# Patient Record
Sex: FEMALE | Race: WHITE | NOT HISPANIC OR LATINO | Employment: FULL TIME | URBAN - METROPOLITAN AREA
[De-identification: names, ages, dates, MRNs, and addresses within clinical notes are randomized per-mention and may not be internally consistent; named-entity substitution may affect disease eponyms.]

---

## 2023-08-10 ENCOUNTER — OFFICE VISIT (OUTPATIENT)
Dept: PRIMARY CARE | Facility: CLINIC | Age: 53
End: 2023-08-10
Payer: COMMERCIAL

## 2023-08-10 VITALS
OXYGEN SATURATION: 98 % | WEIGHT: 250.4 LBS | BODY MASS INDEX: 37.95 KG/M2 | HEIGHT: 68 IN | RESPIRATION RATE: 18 BRPM | SYSTOLIC BLOOD PRESSURE: 142 MMHG | DIASTOLIC BLOOD PRESSURE: 82 MMHG | HEART RATE: 72 BPM

## 2023-08-10 DIAGNOSIS — H90.3 BILATERAL SENSORINEURAL HEARING LOSS: ICD-10-CM

## 2023-08-10 DIAGNOSIS — Z00.00 ANNUAL PHYSICAL EXAM: Primary | ICD-10-CM

## 2023-08-10 DIAGNOSIS — Z12.11 COLON CANCER SCREENING: ICD-10-CM

## 2023-08-10 DIAGNOSIS — E66.01 MORBID OBESITY (MULTI): ICD-10-CM

## 2023-08-10 DIAGNOSIS — E55.9 VITAMIN D DEFICIENCY: ICD-10-CM

## 2023-08-10 DIAGNOSIS — E53.8 B12 DEFICIENCY: ICD-10-CM

## 2023-08-10 PROBLEM — H93.11 TINNITUS OF RIGHT EAR: Status: RESOLVED | Noted: 2023-08-10 | Resolved: 2023-08-10

## 2023-08-10 PROBLEM — H93.11 TINNITUS OF RIGHT EAR: Status: ACTIVE | Noted: 2023-08-10

## 2023-08-10 PROBLEM — H93.291 ABNORMAL AUDITORY PERCEPTION OF RIGHT EAR: Status: ACTIVE | Noted: 2023-08-10

## 2023-08-10 PROBLEM — J31.0 CHRONIC RHINITIS: Status: ACTIVE | Noted: 2023-08-10

## 2023-08-10 PROCEDURE — 1036F TOBACCO NON-USER: CPT | Performed by: STUDENT IN AN ORGANIZED HEALTH CARE EDUCATION/TRAINING PROGRAM

## 2023-08-10 PROCEDURE — 99214 OFFICE O/P EST MOD 30 MIN: CPT | Performed by: STUDENT IN AN ORGANIZED HEALTH CARE EDUCATION/TRAINING PROGRAM

## 2023-08-10 PROCEDURE — 99396 PREV VISIT EST AGE 40-64: CPT | Performed by: STUDENT IN AN ORGANIZED HEALTH CARE EDUCATION/TRAINING PROGRAM

## 2023-08-10 PROCEDURE — 3008F BODY MASS INDEX DOCD: CPT | Performed by: STUDENT IN AN ORGANIZED HEALTH CARE EDUCATION/TRAINING PROGRAM

## 2023-08-10 RX ORDER — SEMAGLUTIDE 0.5 MG/.5ML
0.5 INJECTION, SOLUTION SUBCUTANEOUS
Qty: 2 ML | Refills: 0 | Status: SHIPPED | OUTPATIENT
Start: 2023-08-10 | End: 2023-09-14 | Stop reason: DRUGHIGH

## 2023-08-10 RX ORDER — SEMAGLUTIDE 0.25 MG/.5ML
0.25 INJECTION, SOLUTION SUBCUTANEOUS
COMMUNITY
End: 2023-08-10

## 2023-08-10 RX ORDER — NALTREXONE HYDROCHLORIDE AND BUPROPION HYDROCHLORIDE 8; 90 MG/1; MG/1
2 TABLET, EXTENDED RELEASE ORAL 2 TIMES DAILY
COMMUNITY
Start: 2023-03-09 | End: 2023-08-10 | Stop reason: ALTCHOICE

## 2023-08-10 RX ORDER — ACETAMINOPHEN 500 MG
TABLET ORAL
COMMUNITY
Start: 2010-02-24

## 2023-08-10 ASSESSMENT — PATIENT HEALTH QUESTIONNAIRE - PHQ9
SUM OF ALL RESPONSES TO PHQ9 QUESTIONS 1 AND 2: 0
2. FEELING DOWN, DEPRESSED OR HOPELESS: NOT AT ALL
1. LITTLE INTEREST OR PLEASURE IN DOING THINGS: NOT AT ALL

## 2023-08-10 NOTE — PATIENT INSTRUCTIONS
Things for the nausea associated with your wegovy.  Miralax   Fiber   Smooth move tea (senna based)  Senna 8.6mg 2 tabs at bedtime   Plus increase fluids!

## 2023-08-10 NOTE — PROGRESS NOTES
Subjective   Patient ID: Yadi Rocha is a 53 y.o. female who presents for Establish Care (Pt is here to establish. Previous PCP was Bridgette Todd. ).      Social Hx: tobacco none, illicit drugs none, alcohol none,  for [place]   Mammogram completed   Sleep- 6 hours, no snoring now previously was a problem when she was a bit heavier   Weight loss struggles   Previous contrave use 268lbs previously   Struggled for multiple years       Plan for pap   Utd with dental visits  Due for vision visits- 5 years ago had shingles, using readers             Objective   Physical Exam  Vitals reviewed.   Constitutional:       General: She is not in acute distress.     Appearance: She is not ill-appearing.   HENT:      Right Ear: Tympanic membrane and ear canal normal.      Left Ear: Tympanic membrane and ear canal normal.      Mouth/Throat:      Mouth: Mucous membranes are moist.      Pharynx: Oropharynx is clear. No oropharyngeal exudate or posterior oropharyngeal erythema.   Eyes:      Extraocular Movements: Extraocular movements intact.      Conjunctiva/sclera: Conjunctivae normal.      Pupils: Pupils are equal, round, and reactive to light.   Neck:      Vascular: No carotid bruit.   Cardiovascular:      Rate and Rhythm: Normal rate and regular rhythm.      Heart sounds: No murmur heard.     No gallop.   Pulmonary:      Effort: Pulmonary effort is normal.      Breath sounds: Normal breath sounds. No wheezing, rhonchi or rales.   Abdominal:      General: Abdomen is flat. Bowel sounds are normal.      Palpations: Abdomen is soft.      Tenderness: There is no abdominal tenderness.   Musculoskeletal:      Cervical back: Neck supple.      Left lower leg: No edema.   Skin:     General: Skin is warm and dry.      Findings: No rash.   Neurological:      General: No focal deficit present.      Mental Status: She is alert and oriented to person, place, and time.      Gait: Gait normal.   Psychiatric:          Mood and Affect: Mood normal.         Behavior: Behavior normal.         Assessment/Plan   Problem List Items Addressed This Visit       Bilateral sensorineural hearing loss     Right side   Due to chronic ear infections          Morbid obesity (CMS/HCC)       Physical exam was stable. Discussed maintaining diets such as DASH to help maintain normal Blood pressure. Encouraged regular exercise for a total of 120 min weekly. We will fu labs in 1-3 days. For now there will be no change in medical management. All questions and concerns were addressed. Pt will follow up in 4-6 months or sooner if needed.             Other Visit Diagnoses       Annual physical exam    -  Primary    doing well   concerns for weight loss with BMI of 38%  lipid panel ordered    BMI 38.0-38.9,adult        Relevant Medications    semaglutide, weight loss, (Wegovy) 0.5 mg/0.5 mL pen injector    Other Relevant Orders    CBC and Auto Differential    Comprehensive metabolic panel    Lipid Panel    Tsh With Reflex To Free T4 If Abnormal    Colon cancer screening        Relevant Orders    Colonoscopy    B12 deficiency        Relevant Orders    Vitamin B12    Vitamin D deficiency        Relevant Orders    Vitamin D 25-Hydroxy,Total

## 2023-08-24 NOTE — ASSESSMENT & PLAN NOTE
Physical exam was stable. Discussed maintaining diets such as DASH to help maintain normal Blood pressure. Encouraged regular exercise for a total of 120 min weekly. We will fu labs in 1-3 days. For now there will be no change in medical management. All questions and concerns were addressed. Pt will follow up in 4-6 months or sooner if needed.

## 2023-09-14 ENCOUNTER — TELEPHONE (OUTPATIENT)
Dept: PRIMARY CARE | Facility: CLINIC | Age: 53
End: 2023-09-14
Payer: COMMERCIAL

## 2023-09-14 RX ORDER — SEMAGLUTIDE 1 MG/.5ML
1 INJECTION, SOLUTION SUBCUTANEOUS
Qty: 2 ML | Refills: 0 | Status: SHIPPED | OUTPATIENT
Start: 2023-09-14 | End: 2023-11-02 | Stop reason: DRUGHIGH

## 2023-10-31 ENCOUNTER — TELEPHONE (OUTPATIENT)
Dept: PRIMARY CARE | Facility: CLINIC | Age: 53
End: 2023-10-31
Payer: COMMERCIAL

## 2023-11-02 ENCOUNTER — TELEPHONE (OUTPATIENT)
Dept: PRIMARY CARE | Facility: CLINIC | Age: 53
End: 2023-11-02
Payer: COMMERCIAL

## 2023-11-02 DIAGNOSIS — E66.01 MORBID OBESITY (MULTI): Primary | ICD-10-CM

## 2023-11-07 RX ORDER — SEMAGLUTIDE 1.7 MG/.75ML
1.7 INJECTION, SOLUTION SUBCUTANEOUS
Qty: 3 ML | Refills: 0 | Status: SHIPPED | OUTPATIENT
Start: 2023-11-07 | End: 2023-11-16 | Stop reason: SDUPTHER

## 2023-11-16 ENCOUNTER — OFFICE VISIT (OUTPATIENT)
Dept: PRIMARY CARE | Facility: CLINIC | Age: 53
End: 2023-11-16
Payer: COMMERCIAL

## 2023-11-16 VITALS
WEIGHT: 233.8 LBS | OXYGEN SATURATION: 97 % | HEIGHT: 68 IN | HEART RATE: 77 BPM | DIASTOLIC BLOOD PRESSURE: 76 MMHG | SYSTOLIC BLOOD PRESSURE: 138 MMHG | RESPIRATION RATE: 16 BRPM | BODY MASS INDEX: 35.43 KG/M2

## 2023-11-16 DIAGNOSIS — E66.01 CLASS 2 SEVERE OBESITY DUE TO EXCESS CALORIES WITH SERIOUS COMORBIDITY AND BODY MASS INDEX (BMI) OF 35.0 TO 35.9 IN ADULT (MULTI): Primary | ICD-10-CM

## 2023-11-16 PROCEDURE — 3008F BODY MASS INDEX DOCD: CPT | Performed by: STUDENT IN AN ORGANIZED HEALTH CARE EDUCATION/TRAINING PROGRAM

## 2023-11-16 PROCEDURE — 99213 OFFICE O/P EST LOW 20 MIN: CPT | Performed by: STUDENT IN AN ORGANIZED HEALTH CARE EDUCATION/TRAINING PROGRAM

## 2023-11-16 PROCEDURE — 1036F TOBACCO NON-USER: CPT | Performed by: STUDENT IN AN ORGANIZED HEALTH CARE EDUCATION/TRAINING PROGRAM

## 2023-11-16 RX ORDER — OMEGA-3S/DHA/EPA/FISH OIL 1000-1400
CAPSULE,DELAYED RELEASE (ENTERIC COATED) ORAL
COMMUNITY

## 2023-11-16 RX ORDER — SEMAGLUTIDE 1.7 MG/.75ML
1.7 INJECTION, SOLUTION SUBCUTANEOUS
Qty: 9 ML | Refills: 2 | Status: SHIPPED | OUTPATIENT
Start: 2023-11-16 | End: 2023-12-05

## 2023-11-16 ASSESSMENT — PATIENT HEALTH QUESTIONNAIRE - PHQ9
1. LITTLE INTEREST OR PLEASURE IN DOING THINGS: NOT AT ALL
SUM OF ALL RESPONSES TO PHQ9 QUESTIONS 1 AND 2: 0
2. FEELING DOWN, DEPRESSED OR HOPELESS: NOT AT ALL

## 2023-11-16 NOTE — PROGRESS NOTES
Subjective   Patient ID: Yadi Rocha is a 53 y.o. female who presents for Follow-up (Pt is here for a weight check.).  Pt was started on Wegovy   She is now up to 1.7mg weekly of wegovy   She is sharper and feels much better  She is 17lbs down from previous visit   Continue wegovy   Constipation       Objective   Physical Exam  Vitals reviewed.   Constitutional:       Appearance: Normal appearance.   Cardiovascular:      Rate and Rhythm: Normal rate and regular rhythm.      Heart sounds: No murmur heard.  Pulmonary:      Effort: Pulmonary effort is normal. No respiratory distress.      Breath sounds: Normal breath sounds. No wheezing.   Musculoskeletal:      Cervical back: Neck supple.      Left lower leg: No edema.   Neurological:      Mental Status: She is alert.         Assessment/Plan   Diagnoses and all orders for this visit:  BMI 38.0-38.9,adult  Comments:  continue wegovyat 1.7mg weekly   down 17lbs   BMI 35.55   constipation: advise daily miralax 17g BID, increase hydration, stool softeners PRN  Orders:  -     semaglutide, weight loss, (Wegovy) 1.7 mg/0.75 mL pen injector; Inject 1.7 mg under the skin 1 (one) time per week for 48 doses.

## 2023-12-05 RX ORDER — SEMAGLUTIDE 1.7 MG/.75ML
INJECTION, SOLUTION SUBCUTANEOUS
Qty: 3 ML | Refills: 0 | Status: SHIPPED | OUTPATIENT
Start: 2023-12-05 | End: 2024-02-19 | Stop reason: SDUPTHER

## 2024-02-19 ENCOUNTER — LAB (OUTPATIENT)
Dept: LAB | Facility: LAB | Age: 54
End: 2024-02-19
Payer: COMMERCIAL

## 2024-02-19 ENCOUNTER — OFFICE VISIT (OUTPATIENT)
Dept: PRIMARY CARE | Facility: CLINIC | Age: 54
End: 2024-02-19
Payer: COMMERCIAL

## 2024-02-19 VITALS
DIASTOLIC BLOOD PRESSURE: 74 MMHG | HEIGHT: 68 IN | HEART RATE: 88 BPM | RESPIRATION RATE: 16 BRPM | SYSTOLIC BLOOD PRESSURE: 100 MMHG | WEIGHT: 223 LBS | OXYGEN SATURATION: 98 % | BODY MASS INDEX: 33.8 KG/M2

## 2024-02-19 DIAGNOSIS — E55.9 VITAMIN D DEFICIENCY: ICD-10-CM

## 2024-02-19 DIAGNOSIS — E53.8 B12 DEFICIENCY: ICD-10-CM

## 2024-02-19 DIAGNOSIS — E66.1 CLASS 1 DRUG-INDUCED OBESITY WITHOUT SERIOUS COMORBIDITY WITH BODY MASS INDEX (BMI) OF 33.0 TO 33.9 IN ADULT: Primary | ICD-10-CM

## 2024-02-19 DIAGNOSIS — E66.1 CLASS 1 DRUG-INDUCED OBESITY WITHOUT SERIOUS COMORBIDITY WITH BODY MASS INDEX (BMI) OF 33.0 TO 33.9 IN ADULT: ICD-10-CM

## 2024-02-19 LAB
ALBUMIN SERPL BCP-MCNC: 4.2 G/DL (ref 3.4–5)
ALP SERPL-CCNC: 76 U/L (ref 33–110)
ALT SERPL W P-5'-P-CCNC: 15 U/L (ref 7–45)
ANION GAP SERPL CALC-SCNC: 11 MMOL/L (ref 10–20)
AST SERPL W P-5'-P-CCNC: 15 U/L (ref 9–39)
BASOPHILS # BLD AUTO: 0.03 X10*3/UL (ref 0–0.1)
BASOPHILS NFR BLD AUTO: 0.5 %
BILIRUB SERPL-MCNC: 0.7 MG/DL (ref 0–1.2)
BUN SERPL-MCNC: 14 MG/DL (ref 6–23)
CALCIUM SERPL-MCNC: 9.5 MG/DL (ref 8.6–10.3)
CHLORIDE SERPL-SCNC: 103 MMOL/L (ref 98–107)
CHOLEST SERPL-MCNC: 228 MG/DL (ref 0–199)
CHOLESTEROL/HDL RATIO: 4.4
CO2 SERPL-SCNC: 29 MMOL/L (ref 21–32)
CREAT SERPL-MCNC: 0.8 MG/DL (ref 0.5–1.05)
EGFRCR SERPLBLD CKD-EPI 2021: 88 ML/MIN/1.73M*2
EOSINOPHIL # BLD AUTO: 0.08 X10*3/UL (ref 0–0.7)
EOSINOPHIL NFR BLD AUTO: 1.3 %
ERYTHROCYTE [DISTWIDTH] IN BLOOD BY AUTOMATED COUNT: 12.7 % (ref 11.5–14.5)
GLUCOSE SERPL-MCNC: 89 MG/DL (ref 74–99)
HCT VFR BLD AUTO: 44.9 % (ref 36–46)
HDLC SERPL-MCNC: 52.1 MG/DL
HGB BLD-MCNC: 14.5 G/DL (ref 12–16)
IMM GRANULOCYTES # BLD AUTO: 0.01 X10*3/UL (ref 0–0.7)
IMM GRANULOCYTES NFR BLD AUTO: 0.2 % (ref 0–0.9)
LDLC SERPL CALC-MCNC: 157 MG/DL
LYMPHOCYTES # BLD AUTO: 1.63 X10*3/UL (ref 1.2–4.8)
LYMPHOCYTES NFR BLD AUTO: 27.2 %
MCH RBC QN AUTO: 30 PG (ref 26–34)
MCHC RBC AUTO-ENTMCNC: 32.3 G/DL (ref 32–36)
MCV RBC AUTO: 93 FL (ref 80–100)
MONOCYTES # BLD AUTO: 0.52 X10*3/UL (ref 0.1–1)
MONOCYTES NFR BLD AUTO: 8.7 %
NEUTROPHILS # BLD AUTO: 3.73 X10*3/UL (ref 1.2–7.7)
NEUTROPHILS NFR BLD AUTO: 62.1 %
NON HDL CHOLESTEROL: 176 MG/DL (ref 0–149)
NRBC BLD-RTO: 0 /100 WBCS (ref 0–0)
PLATELET # BLD AUTO: 277 X10*3/UL (ref 150–450)
POTASSIUM SERPL-SCNC: 4.3 MMOL/L (ref 3.5–5.3)
PROT SERPL-MCNC: 6.9 G/DL (ref 6.4–8.2)
RBC # BLD AUTO: 4.83 X10*6/UL (ref 4–5.2)
SODIUM SERPL-SCNC: 139 MMOL/L (ref 136–145)
TRIGL SERPL-MCNC: 94 MG/DL (ref 0–149)
TSH SERPL-ACNC: 1.26 MIU/L (ref 0.44–3.98)
VLDL: 19 MG/DL (ref 0–40)
WBC # BLD AUTO: 6 X10*3/UL (ref 4.4–11.3)

## 2024-02-19 PROCEDURE — 83036 HEMOGLOBIN GLYCOSYLATED A1C: CPT

## 2024-02-19 PROCEDURE — 85025 COMPLETE CBC W/AUTO DIFF WBC: CPT

## 2024-02-19 PROCEDURE — 1036F TOBACCO NON-USER: CPT | Performed by: STUDENT IN AN ORGANIZED HEALTH CARE EDUCATION/TRAINING PROGRAM

## 2024-02-19 PROCEDURE — 99213 OFFICE O/P EST LOW 20 MIN: CPT | Performed by: STUDENT IN AN ORGANIZED HEALTH CARE EDUCATION/TRAINING PROGRAM

## 2024-02-19 PROCEDURE — 82306 VITAMIN D 25 HYDROXY: CPT

## 2024-02-19 PROCEDURE — 36415 COLL VENOUS BLD VENIPUNCTURE: CPT

## 2024-02-19 PROCEDURE — 80061 LIPID PANEL: CPT

## 2024-02-19 PROCEDURE — 82607 VITAMIN B-12: CPT

## 2024-02-19 PROCEDURE — 3008F BODY MASS INDEX DOCD: CPT | Performed by: STUDENT IN AN ORGANIZED HEALTH CARE EDUCATION/TRAINING PROGRAM

## 2024-02-19 PROCEDURE — 80053 COMPREHEN METABOLIC PANEL: CPT

## 2024-02-19 PROCEDURE — 84443 ASSAY THYROID STIM HORMONE: CPT

## 2024-02-19 RX ORDER — SEMAGLUTIDE 1.7 MG/.75ML
1.7 INJECTION, SOLUTION SUBCUTANEOUS
Qty: 9 ML | Refills: 1 | Status: SHIPPED | OUTPATIENT
Start: 2024-02-19

## 2024-02-19 ASSESSMENT — PATIENT HEALTH QUESTIONNAIRE - PHQ9
SUM OF ALL RESPONSES TO PHQ9 QUESTIONS 1 AND 2: 0
1. LITTLE INTEREST OR PLEASURE IN DOING THINGS: NOT AT ALL
2. FEELING DOWN, DEPRESSED OR HOPELESS: NOT AT ALL

## 2024-02-19 NOTE — PROGRESS NOTES
Subjective   Patient ID: Yadi Rocha is a 53 y.o. female who presents for Weight Check (Pt is here for a 3 month weight check.).    Patient was started on Wegovy in August 2023.  We have trended her dosing to the current 1.7 mg weekly.  Patient states that she has noticed a downtrend in weight loss to 1 to 2 pounds a week.  Currently doing 100 min weekly aerobic exercise + weight lifting   Bowel regmine-miralax due to constipation           Objective   Physical Exam  Vitals reviewed.   Constitutional:       Appearance: Normal appearance.   Cardiovascular:      Rate and Rhythm: Normal rate and regular rhythm.      Heart sounds: No murmur heard.  Pulmonary:      Effort: Pulmonary effort is normal. No respiratory distress.      Breath sounds: Normal breath sounds. No wheezing.   Musculoskeletal:      Cervical back: Neck supple.      Left lower leg: No edema.   Neurological:      Mental Status: She is alert.       Assessment/Plan   Diagnoses and all orders for this visit:  Class 1 drug-induced obesity without serious comorbidity with body mass index (BMI) of 33.0 to 33.9 in adult  -     CBC and Auto Differential; Future  -     Comprehensive metabolic panel; Future  -     Lipid Panel; Future  -     Hemoglobin A1C; Future  -     Tsh With Reflex To Free T4 If Abnormal; Future  BMI 38.0-38.9,adult  Comments:  continue wegovyat 1.7mg weekly   down 17lbs   BMI 35.55   constipation: advise daily miralax 17g BID, increase hydration, stool softeners PRN  Orders:  -     semaglutide, weight loss, (Wegovy) 1.7 mg/0.75 mL pen injector; Inject 1.7 mg under the skin 1 (one) time per week.    Obesity   doing well   concerns for weight loss with BMI of down to 33.91kg/m2 from 38% 8/23  1.7 mg weekly   Labs ordered                Toshia Lawson DO 02/19/24 10:45 AM

## 2024-02-20 LAB
25(OH)D3 SERPL-MCNC: 57 NG/ML (ref 30–100)
EST. AVERAGE GLUCOSE BLD GHB EST-MCNC: 94 MG/DL
HBA1C MFR BLD: 4.9 %
VIT B12 SERPL-MCNC: 523 PG/ML (ref 211–911)

## 2024-05-30 ENCOUNTER — TELEPHONE (OUTPATIENT)
Dept: PRIMARY CARE | Facility: CLINIC | Age: 54
End: 2024-05-30
Payer: COMMERCIAL

## 2024-06-12 ENCOUNTER — APPOINTMENT (OUTPATIENT)
Dept: PRIMARY CARE | Facility: CLINIC | Age: 54
End: 2024-06-12
Payer: COMMERCIAL

## 2024-06-12 DIAGNOSIS — E66.1 CLASS 1 DRUG-INDUCED OBESITY WITHOUT SERIOUS COMORBIDITY WITH BODY MASS INDEX (BMI) OF 33.0 TO 33.9 IN ADULT: Primary | ICD-10-CM

## 2024-06-12 PROCEDURE — 3008F BODY MASS INDEX DOCD: CPT | Performed by: STUDENT IN AN ORGANIZED HEALTH CARE EDUCATION/TRAINING PROGRAM

## 2024-06-12 PROCEDURE — 99214 OFFICE O/P EST MOD 30 MIN: CPT | Performed by: STUDENT IN AN ORGANIZED HEALTH CARE EDUCATION/TRAINING PROGRAM

## 2024-06-12 RX ORDER — SEMAGLUTIDE 2.4 MG/.75ML
2.4 INJECTION, SOLUTION SUBCUTANEOUS
Qty: 9 ML | Refills: 0 | Status: SHIPPED | OUTPATIENT
Start: 2024-06-16 | End: 2024-09-02

## 2024-06-29 NOTE — PROGRESS NOTES
Subjective   Patient ID: Yadi Rocha is a 54 y.o. female who presents for No chief complaint on file..  Pt presents to the office for a medication dosage change. Pt has been on wegovy for the past 6 months and has hit a plateau with her weight loss. She states she is getting about 150min weekly exercise but does struggle with her eating habits with portion control.       Assessment/Plan   Obesity   doing well   Nutritionist referral given   concerns for weight loss with BMI of down to 33.91kg/m2 from 38% 8/23  Increase to 2.4mg weekly   Labs ordered        Toshia Lawson DO 06/29/24 3:49 PM

## 2024-08-19 ENCOUNTER — APPOINTMENT (OUTPATIENT)
Dept: PRIMARY CARE | Facility: CLINIC | Age: 54
End: 2024-08-19
Payer: COMMERCIAL

## 2024-09-09 ENCOUNTER — APPOINTMENT (OUTPATIENT)
Dept: PRIMARY CARE | Facility: CLINIC | Age: 54
End: 2024-09-09
Payer: COMMERCIAL

## 2024-09-09 VITALS
DIASTOLIC BLOOD PRESSURE: 82 MMHG | WEIGHT: 221 LBS | HEART RATE: 68 BPM | OXYGEN SATURATION: 98 % | HEIGHT: 68 IN | SYSTOLIC BLOOD PRESSURE: 136 MMHG | BODY MASS INDEX: 33.49 KG/M2

## 2024-09-09 DIAGNOSIS — G89.29 CHRONIC LEFT SHOULDER PAIN: ICD-10-CM

## 2024-09-09 DIAGNOSIS — M25.512 CHRONIC LEFT SHOULDER PAIN: ICD-10-CM

## 2024-09-09 DIAGNOSIS — E66.1 CLASS 1 DRUG-INDUCED OBESITY WITHOUT SERIOUS COMORBIDITY WITH BODY MASS INDEX (BMI) OF 33.0 TO 33.9 IN ADULT: Primary | ICD-10-CM

## 2024-09-09 DIAGNOSIS — Z12.31 SCREENING MAMMOGRAM FOR BREAST CANCER: ICD-10-CM

## 2024-09-09 PROCEDURE — 1036F TOBACCO NON-USER: CPT | Performed by: STUDENT IN AN ORGANIZED HEALTH CARE EDUCATION/TRAINING PROGRAM

## 2024-09-09 PROCEDURE — 99214 OFFICE O/P EST MOD 30 MIN: CPT | Performed by: STUDENT IN AN ORGANIZED HEALTH CARE EDUCATION/TRAINING PROGRAM

## 2024-09-09 PROCEDURE — 3008F BODY MASS INDEX DOCD: CPT | Performed by: STUDENT IN AN ORGANIZED HEALTH CARE EDUCATION/TRAINING PROGRAM

## 2024-09-09 RX ORDER — SEMAGLUTIDE 2.4 MG/.75ML
2.4 INJECTION, SOLUTION SUBCUTANEOUS
Qty: 9 ML | Refills: 0 | Status: SHIPPED | OUTPATIENT
Start: 2024-09-15 | End: 2024-12-02

## 2024-09-09 RX ORDER — BUPROPION HYDROCHLORIDE 75 MG/1
75 TABLET ORAL 2 TIMES DAILY
Qty: 60 TABLET | Refills: 5 | Status: SHIPPED | OUTPATIENT
Start: 2024-09-09 | End: 2025-03-08

## 2024-09-09 NOTE — PROGRESS NOTES
Subjective   Patient ID: Yadi Rocha is a 54 y.o. female who presents for Follow-up (Pt is here for a 6 mo follow up.).    HPI   Pt last weight at home 219 last visit now 216.8    Pt would like to get 180lbs   Last time at this weight: prior to     Since she started lifting weights she has been having trouble with abduction     Objective   Physical Exam  Vitals reviewed.   Constitutional:       Appearance: Normal appearance.   Cardiovascular:      Rate and Rhythm: Normal rate and regular rhythm.      Heart sounds: No murmur heard.  Pulmonary:      Effort: Pulmonary effort is normal. No respiratory distress.      Breath sounds: Normal breath sounds. No wheezing.   Musculoskeletal:      Cervical back: Neck supple.      Left lower leg: No edema.   Neurological:      Mental Status: She is alert.         Current Outpatient Medications:   •  cholecalciferol (Vitamin D3) 50 mcg (2,000 unit) capsule, Take by mouth., Disp: , Rfl:   •  cyanocobalamin, vitamin B-12, 1,000 mcg/mL drops, Take by mouth., Disp: , Rfl:   •  inulin (Fiber Gummies) 2 gram tablet,chewable, Chew., Disp: , Rfl:   •  semaglutide, weight loss, (Wegovy) 2.4 mg/0.75 mL pen injector, Inject 2.4 mg under the skin 1 (one) time per week for 12 doses., Disp: 9 mL, Rfl: 0    Assessment/Plan   Diagnoses and all orders for this visit:  Class 1 drug-induced obesity without serious comorbidity with body mass index (BMI) of 33.0 to 33.9 in adult  -     buPROPion (Wellbutrin) 75 mg tablet; Take 1 tablet (75 mg) by mouth 2 times a day.  -     semaglutide, weight loss, (Wegovy) 2.4 mg/0.75 mL pen injector; Inject 2.4 mg under the skin 1 (one) time per week for 12 doses.  Screening mammogram for breast cancer  -     BI mammo bilateral screening tomosynthesis; Future    Obesity   doing well   Nutritionist referral given   concerns for weight loss with BMI of down to 33.60kg/m2 33.91kg/m2 from 38% 8/23  Continue to 2.4mg weekly   Discussed trial of another  medication to aide in weight loss  Trial of wellbutrin  Labs ordered        Toshia Lawson DO 09/09/24 12:45 PM

## 2024-09-16 ENCOUNTER — HOSPITAL ENCOUNTER (OUTPATIENT)
Dept: RADIOLOGY | Facility: HOSPITAL | Age: 54
Discharge: HOME | End: 2024-09-16
Payer: COMMERCIAL

## 2024-09-16 VITALS — HEIGHT: 68 IN | BODY MASS INDEX: 32.89 KG/M2 | WEIGHT: 217 LBS

## 2024-09-16 DIAGNOSIS — Z12.31 SCREENING MAMMOGRAM FOR BREAST CANCER: ICD-10-CM

## 2024-09-16 PROCEDURE — 77067 SCR MAMMO BI INCL CAD: CPT | Performed by: RADIOLOGY

## 2024-09-16 PROCEDURE — 77067 SCR MAMMO BI INCL CAD: CPT

## 2024-09-16 PROCEDURE — 77063 BREAST TOMOSYNTHESIS BI: CPT | Performed by: RADIOLOGY

## 2024-09-20 ENCOUNTER — HOSPITAL ENCOUNTER (OUTPATIENT)
Dept: RADIOLOGY | Facility: CLINIC | Age: 54
Discharge: HOME | End: 2024-09-20
Payer: COMMERCIAL

## 2024-09-20 ENCOUNTER — OFFICE VISIT (OUTPATIENT)
Dept: ORTHOPEDIC SURGERY | Facility: CLINIC | Age: 54
End: 2024-09-20
Payer: COMMERCIAL

## 2024-09-20 VITALS — WEIGHT: 217 LBS | HEIGHT: 68 IN | BODY MASS INDEX: 32.89 KG/M2

## 2024-09-20 DIAGNOSIS — M25.512 LEFT SHOULDER PAIN, UNSPECIFIED CHRONICITY: ICD-10-CM

## 2024-09-20 DIAGNOSIS — M25.512 LEFT SHOULDER PAIN, UNSPECIFIED CHRONICITY: Primary | ICD-10-CM

## 2024-09-20 DIAGNOSIS — G89.29 CHRONIC LEFT SHOULDER PAIN: ICD-10-CM

## 2024-09-20 DIAGNOSIS — M25.512 CHRONIC LEFT SHOULDER PAIN: ICD-10-CM

## 2024-09-20 PROCEDURE — 2500000004 HC RX 250 GENERAL PHARMACY W/ HCPCS (ALT 636 FOR OP/ED)

## 2024-09-20 PROCEDURE — 20610 DRAIN/INJ JOINT/BURSA W/O US: CPT | Mod: LT

## 2024-09-20 PROCEDURE — 99214 OFFICE O/P EST MOD 30 MIN: CPT | Mod: 25

## 2024-09-20 PROCEDURE — 2500000005 HC RX 250 GENERAL PHARMACY W/O HCPCS

## 2024-09-20 PROCEDURE — 73030 X-RAY EXAM OF SHOULDER: CPT | Mod: LT

## 2024-09-20 PROCEDURE — 99204 OFFICE O/P NEW MOD 45 MIN: CPT

## 2024-09-20 PROCEDURE — 1036F TOBACCO NON-USER: CPT

## 2024-09-20 PROCEDURE — 3008F BODY MASS INDEX DOCD: CPT

## 2024-09-20 RX ORDER — TRIAMCINOLONE ACETONIDE 40 MG/ML
40 INJECTION, SUSPENSION INTRA-ARTICULAR; INTRAMUSCULAR
Status: COMPLETED | OUTPATIENT
Start: 2024-09-20 | End: 2024-09-20

## 2024-09-20 RX ORDER — LIDOCAINE HYDROCHLORIDE 5 MG/ML
4 INJECTION, SOLUTION INFILTRATION; PERINEURAL
Status: COMPLETED | OUTPATIENT
Start: 2024-09-20 | End: 2024-09-20

## 2024-09-20 ASSESSMENT — PAIN - FUNCTIONAL ASSESSMENT: PAIN_FUNCTIONAL_ASSESSMENT: 0-10

## 2024-09-20 ASSESSMENT — PAIN DESCRIPTION - DESCRIPTORS: DESCRIPTORS: SHARP;SHOOTING;ACHING

## 2024-09-20 ASSESSMENT — PAIN SCALES - GENERAL: PAINLEVEL_OUTOF10: 6

## 2024-09-20 NOTE — PROGRESS NOTES
HPI  Yadi Rocha is a 54 y.o. female  in office today for   Chief Complaint   Patient presents with    Left Shoulder - Pain     Last summer she was moving tables?  She has limited ROM without pain    .  she states pain is posterolateral shoulder, worse with abduction of the shoulder.  She has tried home PT, RICE, chiropractor which will have temporarily.      Medication  Current Outpatient Medications on File Prior to Visit   Medication Sig Dispense Refill    buPROPion (Wellbutrin) 75 mg tablet Take 1 tablet (75 mg) by mouth 2 times a day. 60 tablet 5    cholecalciferol (Vitamin D3) 50 mcg (2,000 unit) capsule Take by mouth.      cyanocobalamin, vitamin B-12, 1,000 mcg/mL drops Take by mouth.      inulin (Fiber Gummies) 2 gram tablet,chewable Chew.      semaglutide, weight loss, (Wegovy) 2.4 mg/0.75 mL pen injector Inject 2.4 mg under the skin 1 (one) time per week for 12 doses. 9 mL 0     No current facility-administered medications on file prior to visit.       Physical Exam  Constitutional: well developed, well nourished female in no acute distress  Psychiatric: normal mood, appropriate affect  Eyes: sclera anicteric  HENT: normocephalic/atraumatic  CV: regular rate and rhythm   Respiratory: non labored breathing  Integumentary: no rash  Neurological: moves all extremities    Shoulder Musculoskeletal Exam    Inspection    Left      Ecchymosis: none      Peripheral edema: none      Atrophy: none      Symmetry: symmetric    Palpation    Left      Crepitus: no crepitus      Increased warmth: none      Tenderness: present        Clavicle: none        AC joint: none        Sternoclavicular joint: none        Rotator cuff: mild        Trapezius: none        Bicipital groove: none        Proximal biceps: none    Range of Motion    Left      Active ROM: pain.       Active forward elevation: 180.       Shoulder active abduction: 170 (pain above 90).       Active external rotation in abduction: 80.       Internal  rotation: mid thoracic.     Strength    Left      External rotation: 5/5.       Internal rotation: 5/5.       Abduction: 4/5.     Special Tests    Left     Rotator Cuff Signs      Neer's test: positive       Carreon test: positive       Drop arm test: negative     Biceps/harini Signs      Speed's test: negative     AC Joint Signs      Active horizontal adduction pain: negative     Instability Signs      Joint laxity: negative       L Inj/Asp: L subacromial bursa on 9/20/2024 9:41 AM  Indications: pain  Details: 22 G needle, posterior approach  Medications: 40 mg triamcinolone acetonide 40 mg/mL; 4 mL lidocaine 5 mg/mL (0.5 %)  Outcome: tolerated well, no immediate complications  Procedure, treatment alternatives, risks and benefits explained, specific risks discussed. Consent was given by the patient. Immediately prior to procedure a time out was called to verify the correct patient, procedure, equipment, support staff and site/side marked as required. Patient was prepped and draped in the usual sterile fashion.         Imaging/Lab:  X-rays were taken today which were reviewed by myself and read by myself and show no acute fracture or dislocation.  No significant degenerative changes      Assessment  Assessment: left shoulder pain with concern for rotator cuff pathology    Plan  Plan:  History, physical exam, and imaging were reviewed with patient. Discussed options for shoulder pain and rotator cuff pain including RICE, antiinflammatories, PT vs home exercises, and injections.  She would like to try an injection at this time as well is willing to work with PT.  The left shoulder was injected per procedure note above,  PT orders given.  Follow Up: Patient to follow up as needed if pain persists or gets worse after trial of therapy.      All questions were answered for the patient prior to end of exam and patient addressed their understanding.    Brunilda Segura PA-C  09/20/24

## 2024-10-04 ENCOUNTER — EVALUATION (OUTPATIENT)
Dept: PHYSICAL THERAPY | Facility: CLINIC | Age: 54
End: 2024-10-04
Payer: COMMERCIAL

## 2024-10-04 DIAGNOSIS — M25.512 LEFT SHOULDER PAIN, UNSPECIFIED CHRONICITY: Primary | ICD-10-CM

## 2024-10-04 PROCEDURE — 97161 PT EVAL LOW COMPLEX 20 MIN: CPT | Mod: GP

## 2024-10-04 PROCEDURE — 97110 THERAPEUTIC EXERCISES: CPT | Mod: GP

## 2024-10-04 ASSESSMENT — ENCOUNTER SYMPTOMS
LOSS OF SENSATION IN FEET: 0
DEPRESSION: 0
OCCASIONAL FEELINGS OF UNSTEADINESS: 0

## 2024-10-04 ASSESSMENT — PAIN SCALES - GENERAL: PAINLEVEL_OUTOF10: 0 - NO PAIN

## 2024-10-04 ASSESSMENT — PAIN - FUNCTIONAL ASSESSMENT: PAIN_FUNCTIONAL_ASSESSMENT: 0-10

## 2024-10-04 NOTE — PROGRESS NOTES
Physical Therapy  Physical Therapy Evaluation    Patient Name: Yadi Rocha  MRN: [id number]  Today's Date: 10/4/2024  Time Calculation  Start Time: 1100  Stop Time: 1140  Time Calculation (min): 40 min    Insurance:  Visit number: 1  Insurance Type: requires no auth    General  Reason for visit: General  Reason for Referral: L shoulder pain  Referred By: Imelda Dickey Comment: pt presents to PT clinic for L shoulder pain, pt is left hand dmoinant, she has had the pain for about a year, she notices more when she opens the car door, recently had injection and has noticed a significant difference for the good    Current Problem  1. 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  She displays pain with MMT as well as impingement s/s suggesting tendinopathy in teres minor and/or subscapularis.  She would benefit from skilled therapy to improve RTC strength, motor control, and reduce pain from impingement.      Plan:   Treatment/Interventions: Cryotherapy, Dry needling, Education/ Instruction, Electrical stimulation, Hot pack, Manual therapy, Neuromuscular re-education, Self care/ home management, Taping techniques, Therapeutic activities, Therapeutic exercises  PT Frequency: 1 time per week  Duration: x12 weeks  Onset Date: 10/01/23    Precautions:   Precautions  STEADI Fall Risk Score (The score of 4 or more indicates an increased risk of falling): 0  Precautions Comment: none    Medical History Form: Reviewed (scanned into chart)    Subjective:   Onset Date: 10/01/23  SAMEER: Insidious     Current Condition since injury:   better      Social Determinants of health: No    PAIN  Pain Assessment: 0-10  0-10 (Numeric) Pain Score: 0 - No pain (10/10 at its worst)  Pain Type: Acute  pain  Pain Location: Shoulder  Pain Orientation: Left  Aggravating Factors: Reaching overhead, Lifting/Carrying, and Driving   Relieving Factors: Rest, Ice, and Heat     Relevant Information (PMH & Previous Tests/Imaging): unremarkable   Previous Interventions/Treatments: Injections     Prior Level of Function (PLOF)  Exercise/Physical Activity: cleaning  Work/School:   Current ADL/IADL Status: mod I      Patients Living Environment: Reviewed and no concern    Primary Language: English    Pt goals for therapy: to reduce pain    Red Flags:   REVIEW OF SYSTEMS/ RED FLAGS  (-) osteoporosis  (-) Cough/ Sneeze   (-) balance difficulties/FALLS   (-) numbness/tingle  (-) weakness  (-) unremitting night pain   Sleep position:  (-) AM Stiffness           (-) Unexplained Wt. Loss  (-) h/o CA   (-) Pacemaker  (-) Bowel/Bladder            (-) saddle paresthesia    Objective:  FLOW SHEET  Shoulder Functional Rating Scale  Quick Dash: 20& self reported disability    Shoulder Observation  Shoulder Observation Comment: slight rounded shoulders    Shoulder Palpation/Joint Mobility Assessment  Shoulder Palpation/Joint Mobility Comment: TTP teres minor,    Cervical AROM  Cervical AROM WFL: yes    Shoulder AROM    R Shoulder flexion: (180°): 145  L Shoulder flexion: (180°): 140  R shoulder abduction: (180°): 150  L Shoulder abduction: (180°): 115  R Shoulder ER: (90°): T3  R shoulder IR: (70°): T8    Shoulder PROM  R shoulder flexion: (180°): 160    Shoulder Strength  L shoulder flexion: (5/5): 3+/5* pain  L shoulder abduction: (5/5): 3+/5* pain  L shoulder ER: (5/5): 4-/5* pain  L shoulder IR: (5/5): 4/5 *pain    Shoulder Special Tests  Painful arc: Negative: pos L  Infraspinatus MMT: Negative: neg  Drop Arm Test: Negative: pos L  Neer: (Negative): pos L  Painful arc: (Negative): pos L  Biceps Load II: (Negative): neg  UE Special Tests Comments: pos lift off for pain,      EDUCATION: home exercise program, plan  of care, activity modifications, pain management, and injury pathology       Goals:  Active       PT Problem       demo HEP w/ at least 75% accuracy in order signify understanding of HEP for improvement of shoulder mobility         Start:  10/04/24    Expected End:  01/02/25            demonstrate 2/3 a muscle grade strength increase in  L shoulder in order to complete exercise classes at [facility] center        Start:  10/04/24    Expected End:  01/02/25            increased ROM of L shoulder 20 degrees in order to improve reaching overhead        Start:  10/04/24    Expected End:  01/02/25            report GROC +3 or greater (MCID) in order to show self perceived improvement with therapy        Start:  10/04/24    Expected End:  01/02/25            Pt will improve QuickDASH score by 10% to demonstrate in order to show increased functional mobility        Start:  10/04/24    Expected End:  01/02/25                Plan of care was developed with input and agreement by the patient    Potential to achieve goals:  Good    Treatments:   This therapist instructed and demonstrated interventions to patient, patient completed the following under direct supervision of this therapist:  Therapeutic Exercise:   min  20 MINUTES  Therapeutic Exercise  Therapeutic Exercise Activity 1: supine AAROM flexion with dowel  Therapeutic Exercise Activity 2: supine AAROM abduction with dowel  Therapeutic Exercise Activity 3: seated AAROM ER with dowel  Therapeutic Exercise Activity 4: shoulder IR red TB x30  Therapeutic Exercise Activity 5: attempted shoulder ER AROM, isometric, ceased due to pain      PT Evaluation Time Entry  PT Evaluation (Low) Time Entry: 25    Erasmo Rodriguez, PT    Access Code: [email or web address]  URL: [email or web address]  Date: 10/04/2024  Prepared by: Erasmo Rodriguez    Exercises  - Supine Shoulder Flexion Extension AAROM with Dowel  - 2 x daily - 7 x weekly - 3 sets - 20-30 reps  - Supine Shoulder  Abduction AAROM with Dowel  - 2 x daily - 7 x weekly - 3 sets - 20-30 reps  - Seated Shoulder External Rotation with Cane and Hand in Neutral (Mirrored)  - 2 x daily - 7 x weekly - 3 sets - 20-30 reps  - Shoulder Internal Rotation with Resistance (Mirrored)  - 2 x daily - 7 x weekly - 3 sets - 20-30 reps

## 2024-10-04 NOTE — Clinical Note
October 4, 2024    Erasmo Rodriguez, PT  [address] [facility]  Rehab Services, [facility] [address]  [facility] [address]    Patient: Yadi Rocha   YOB: 1970   Date of Visit: 10/4/2024       Dear Brunilda Segura PA-C  [address] [facility]  [place] [address]    The attached plan of care is being sent to you because your patient’s medical reimbursement requires that you certify the plan of care. Your signature is required to allow uninterrupted insurance coverage.      You may indicate your approval by signing below and faxing this form back to us at Dept Fax: [phone].    Please call Dept: [phone] with any questions or concerns.    Thank you for this referral,        Erasmo Rodriguez, PT  [facility] [address] [facility]  [address] [facility] [address]    Payer: Payor: ADELA / Plan: ANTHEM TRADITIONAL / Product Type: *No Product type* /                                                                         Date:     Dear Erasmo Rodriguez PT,     Re: Ms. Yadi Rocha, MRN:[id number]    I certify that I have reviewed the attached plan of care and it is medically necessary for Ms. Yadi Rocha (1970) who is under my care.          ______________________________________                    _________________  Provider name and credentials                                           Date and time                                                                                           Plan of Care 10/4/24   Effective from: 10/4/2024  Effective to: 1/2/2025    Plan ID: [id number]            Participants as of Finalize on 10/4/2024    Name Type Comments Contact Info    Brunilda Segura PA-C Referring Provider  [phone]    Erasmo Rodriguez PT Physical Therapist  [phone]       Last Plan Note     Author: Erasmo Rodriguez PT Status: Sign when Signing Visit Last edited: 10/4/2024 11:00 AM           Physical Therapy  Physical Therapy Evaluation    Patient Name:  Yadi Rocha  MRN: [id number]  Today's Date: 10/4/2024  Time Calculation  Start Time: 1100  Stop Time: 1140  Time Calculation (min): 40 min    Insurance:  Visit number: 1  Insurance Type: requires no auth    General  Reason for visit: General  Reason for Referral: L shoulder pain  Referred By: Imelda Dickey Comment: pt presents to PT clinic for L shoulder pain, pt is left hand dmoinant, she has had the pain for about a year, she notices more when she opens the car door, recently had injection and has noticed a significant difference for the good    Current Problem  1. 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  She displays pain with MMT as well as impingement s/s suggesting tendinopathy in teres minor and/or subscapularis.  She would benefit from skilled therapy to improve RTC strength, motor control, and reduce pain from impingement.      Plan:   Treatment/Interventions: Cryotherapy, Dry needling, Education/ Instruction, Electrical stimulation, Hot pack, Manual therapy, Neuromuscular re-education, Self care/ home management, Taping techniques, Therapeutic activities, Therapeutic exercises  PT Frequency: 1 time per week  Duration: x12 weeks  Onset Date: 10/01/23    Precautions:   Precautions  STEADI Fall Risk Score (The score of 4 or more indicates an increased risk of falling): 0  Precautions Comment: none    Medical History Form: Reviewed (scanned into chart)    Subjective:   Onset Date: 10/01/23  SAMEER: Insidious     Current Condition since injury:   better      Social Determinants of health: No    PAIN  Pain Assessment: 0-10  0-10 (Numeric) Pain Score: 0 - No pain (10/10 at its worst)  Pain Type: Acute pain  Pain Location: Shoulder  Pain Orientation: Left  Aggravating  Factors: Reaching overhead, Lifting/Carrying, and Driving   Relieving Factors: Rest, Ice, and Heat     Relevant Information (PMH & Previous Tests/Imaging): unremarkable   Previous Interventions/Treatments: Injections     Prior Level of Function (PLOF)  Exercise/Physical Activity: cleaning  Work/School:   Current ADL/IADL Status: mod I      Patients Living Environment: Reviewed and no concern    Primary Language: English    Pt goals for therapy: to reduce pain    Red Flags:   REVIEW OF SYSTEMS/ RED FLAGS  (-) osteoporosis  (-) Cough/ Sneeze   (-) balance difficulties/FALLS   (-) numbness/tingle  (-) weakness  (-) unremitting night pain   Sleep position:  (-) AM Stiffness           (-) Unexplained Wt. Loss  (-) h/o CA   (-) Pacemaker  (-) Bowel/Bladder            (-) saddle paresthesia    Objective:  FLOW SHEET  Shoulder Functional Rating Scale  Quick Dash: 20& self reported disability    Shoulder Observation  Shoulder Observation Comment: slight rounded shoulders    Shoulder Palpation/Joint Mobility Assessment  Shoulder Palpation/Joint Mobility Comment: TTP teres minor,    Cervical AROM  Cervical AROM WFL: yes    Shoulder AROM    R Shoulder flexion: (180°): 145  L Shoulder flexion: (180°): 140  R shoulder abduction: (180°): 150  L Shoulder abduction: (180°): 115  R Shoulder ER: (90°): T3  R shoulder IR: (70°): T8    Shoulder PROM  R shoulder flexion: (180°): 160    Shoulder Strength  L shoulder flexion: (5/5): 3+/5* pain  L shoulder abduction: (5/5): 3+/5* pain  L shoulder ER: (5/5): 4-/5* pain  L shoulder IR: (5/5): 4/5 *pain    Shoulder Special Tests  Painful arc: Negative: pos L  Infraspinatus MMT: Negative: neg  Drop Arm Test: Negative: pos L  Neer: (Negative): pos L  Painful arc: (Negative): pos L  Biceps Load II: (Negative): neg  UE Special Tests Comments: pos lift off for pain,      EDUCATION: home exercise program, plan of care, activity modifications, pain management, and injury  pathology       Goals:  Active       PT Problem       demo HEP w/ at least 75% accuracy in order signify understanding of HEP for improvement of shoulder mobility         Start:  10/04/24    Expected End:  01/02/25            demonstrate 2/3 a muscle grade strength increase in  L shoulder in order to complete exercise classes at [facility] center        Start:  10/04/24    Expected End:  01/02/25            increased ROM of L shoulder 20 degrees in order to improve reaching overhead        Start:  10/04/24    Expected End:  01/02/25            report GROC +3 or greater (MCID) in order to show self perceived improvement with therapy        Start:  10/04/24    Expected End:  01/02/25            Pt will improve QuickDASH score by 10% to demonstrate in order to show increased functional mobility        Start:  10/04/24    Expected End:  01/02/25                Plan of care was developed with input and agreement by the patient    Potential to achieve goals:  Good    Treatments:   This therapist instructed and demonstrated interventions to patient, patient completed the following under direct supervision of this therapist:  Therapeutic Exercise:   min  20 MINUTES  Therapeutic Exercise  Therapeutic Exercise Activity 1: supine AAROM flexion with dowel  Therapeutic Exercise Activity 2: supine AAROM abduction with dowel  Therapeutic Exercise Activity 3: seated AAROM ER with dowel  Therapeutic Exercise Activity 4: shoulder IR red TB x30  Therapeutic Exercise Activity 5: attempted shoulder ER AROM, isometric, ceased due to pain      PT Evaluation Time Entry  PT Evaluation (Low) Time Entry: 25    Erasmo Rodriguez, PT    Access Code: [email or web address]  URL: [email or web address]  Date: 10/04/2024  Prepared by: Erasmo Rodriguez    Exercises  - Supine Shoulder Flexion Extension AAROM with Dowel  - 2 x daily - 7 x weekly - 3 sets - 20-30 reps  - Supine Shoulder Abduction AAROM with Dowel  - 2 x daily - 7 x weekly - 3 sets  - 20-30 reps  - Seated Shoulder External Rotation with Cane and Hand in Neutral (Mirrored)  - 2 x daily - 7 x weekly - 3 sets - 20-30 reps  - Shoulder Internal Rotation with Resistance (Mirrored)  - 2 x daily - 7 x weekly - 3 sets - 20-30 reps         Current Participants as of 10/4/2024    Name Type Comments Contact Info    Brunilda Segura PA-C Referring Provider  [phone]    Signature pending    Erasmo Rodriguez PT Physical Therapist  [phone]    Signature pending

## 2024-10-21 ENCOUNTER — APPOINTMENT (OUTPATIENT)
Dept: PHYSICAL THERAPY | Facility: CLINIC | Age: 54
End: 2024-10-21
Payer: COMMERCIAL

## 2024-10-21 DIAGNOSIS — M25.512 LEFT SHOULDER PAIN, UNSPECIFIED CHRONICITY: ICD-10-CM

## 2024-10-21 PROCEDURE — 97110 THERAPEUTIC EXERCISES: CPT | Mod: GP

## 2024-10-21 PROCEDURE — 97140 MANUAL THERAPY 1/> REGIONS: CPT | Mod: GP

## 2024-10-21 ASSESSMENT — PAIN - FUNCTIONAL ASSESSMENT: PAIN_FUNCTIONAL_ASSESSMENT: 0-10

## 2024-10-21 ASSESSMENT — PAIN SCALES - GENERAL: PAINLEVEL_OUTOF10: 0 - NO PAIN

## 2024-10-21 NOTE — PROGRESS NOTES
"    Physical Therapy  Physical Therapy Treatment    Patient Name: Yadi Rocha  MRN: [id number]  Today's Date: 10/21/2024  Time Calculation  Start Time: 1447  Stop Time: 1530  Time Calculation (min): 43 min    Assessment:   Pt required some cueing for maintaining exercises in a pain free manner, no adverse effect from dry cupping, she was able to perform shoulder ER isometrics this date after extensive cueing on the force of push into PT hand to reduce pain    Pt completed treatment with moderate effort this date    Plan:  Continue with AAROM and ISO metrics as tolerated       General  Chief Complaint:   1. Left shoulder pain, unspecified chronicity  Follow Up In Physical Therapy          Subjective:     Pt reports having more pain and she thinks the injection is wearing off    Current Problem  General  Reason for Referral: L shoulder pain  Referred By: Imelda  General Comment: visit 2    Precautions  Precautions Comment: none    Performing HEP?: Yes    Pain  Pain Assessment: 0-10  0-10 (Numeric) Pain Score: 0 - No pain  Pain Type: Acute pain  Pain Location: Shoulder  Pain Orientation: Left    Objective:     Treatments:   This therapist instructed and demonstrated interventions to patient, patient completed the following under direct supervision of this therapist:  Therapeutic Exercise:   min  29 MINUTES  Therapeutic Exercise  Therapeutic Exercise Activity 1: shoulder pulleys flexion and scaption x3 mins each  Therapeutic Exercise Activity 2: supine AAROM abduction with dowel x30  Therapeutic Exercise Activity 3: supine AAROM flexion with dowel x30  Therapeutic Exercise Activity 4: shoulder IR red TB x30, green TB x30  Therapeutic Exercise Activity 5: shouder ER isometric 8x15\"  Therapeutic Exercise Activity 6: pendulums CW/CCW  Manual Therapy:       14 MINUTES  Manual Therapy  Manual Therapy Activity 1: dyanmic cupping to lateral scapula, damion minor/major, and lats  Manual Therapy Activity 2: static cupping " posterior GHJ, levator, and teres minor during scapu retractions      Active       PT Problem       demo HEP w/ at least 75% accuracy in order signify understanding of HEP for improvement of shoulder mobility         Start:  10/04/24    Expected End:  01/02/25            demonstrate 2/3 a muscle grade strength increase in  L shoulder in order to complete exercise classes at [facility] center        Start:  10/04/24    Expected End:  01/02/25            increased ROM of L shoulder 20 degrees in order to improve reaching overhead        Start:  10/04/24    Expected End:  01/02/25            report GROC +3 or greater (MCID) in order to show self perceived improvement with therapy        Start:  10/04/24    Expected End:  01/02/25            Pt will improve QuickDASH score by 10% to demonstrate in order to show increased functional mobility        Start:  10/04/24    Expected End:  01/02/25                Education:   Discussed pain free motion    Erasmo Rodriguez, PT

## 2024-10-24 ENCOUNTER — TELEPHONE (OUTPATIENT)
Dept: PRIMARY CARE | Facility: CLINIC | Age: 54
End: 2024-10-24
Payer: COMMERCIAL

## 2024-11-01 ENCOUNTER — TREATMENT (OUTPATIENT)
Dept: PHYSICAL THERAPY | Facility: CLINIC | Age: 54
End: 2024-11-01
Payer: COMMERCIAL

## 2024-11-01 DIAGNOSIS — M25.512 LEFT SHOULDER PAIN, UNSPECIFIED CHRONICITY: ICD-10-CM

## 2024-11-01 PROCEDURE — 97140 MANUAL THERAPY 1/> REGIONS: CPT | Mod: GP | Performed by: PHYSICAL THERAPIST

## 2024-11-01 PROCEDURE — 97110 THERAPEUTIC EXERCISES: CPT | Mod: GP | Performed by: PHYSICAL THERAPIST

## 2024-11-01 ASSESSMENT — PAIN - FUNCTIONAL ASSESSMENT: PAIN_FUNCTIONAL_ASSESSMENT: 0-10

## 2024-11-01 ASSESSMENT — PAIN SCALES - GENERAL: PAINLEVEL_OUTOF10: 0 - NO PAIN

## 2024-11-04 ENCOUNTER — APPOINTMENT (OUTPATIENT)
Dept: PHYSICAL THERAPY | Facility: CLINIC | Age: 54
End: 2024-11-04
Payer: COMMERCIAL

## 2024-11-13 ENCOUNTER — TREATMENT (OUTPATIENT)
Dept: PHYSICAL THERAPY | Facility: CLINIC | Age: 54
End: 2024-11-13
Payer: COMMERCIAL

## 2024-11-13 DIAGNOSIS — M25.512 LEFT SHOULDER PAIN, UNSPECIFIED CHRONICITY: ICD-10-CM

## 2024-11-13 PROCEDURE — 97140 MANUAL THERAPY 1/> REGIONS: CPT | Mod: GP | Performed by: PHYSICAL THERAPIST

## 2024-11-13 PROCEDURE — 97110 THERAPEUTIC EXERCISES: CPT | Mod: GP | Performed by: PHYSICAL THERAPIST

## 2024-11-13 ASSESSMENT — PAIN - FUNCTIONAL ASSESSMENT: PAIN_FUNCTIONAL_ASSESSMENT: 0-10

## 2024-11-13 ASSESSMENT — PAIN SCALES - GENERAL: PAINLEVEL_OUTOF10: 0 - NO PAIN

## 2024-11-13 NOTE — PROGRESS NOTES
Physical Therapy  Physical Therapy Treatment    Patient Name: Yadi Rocha  MRN: [id number]  Today's Date: 11/13/2024  Time Calculation  Start Time: 1359  Stop Time: 1437  Time Calculation (min): 38 min    General  Reason for Referral: L shoulder pain  Referred By: Imelda Dickey Comment: visit 4  Assessment:    Patient reports that she feels no progress with PT treatments at this time. She and PT discussed returning to ortho for further testing and examination. She is understanding of this and comfortable with that recommendation.  Plan:  Patient and PT discussed returning to ortho     Active       PT Problem       demo HEP w/ at least 75% accuracy in order signify understanding of HEP for improvement of shoulder mobility         Start:  10/04/24    Expected End:  01/02/25            demonstrate 2/3 a muscle grade strength increase in  L shoulder in order to complete [facility] classes at senior center        Start:  10/04/24    Expected End:  01/02/25            increased ROM of L shoulder 20 degrees in order to improve reaching overhead        Start:  10/04/24    Expected End:  01/02/25            report GROC +3 or greater (MCID) in order to show self perceived improvement with therapy        Start:  10/04/24    Expected End:  01/02/25            Pt will improve QuickDASH score by 10% to demonstrate in order to show increased functional mobility        Start:  10/04/24    Expected End:  01/02/25                General  Chief Complaint:   1. Left shoulder pain, unspecified chronicity  Follow Up In Physical Therapy          Subjective:     Current Problem  General  Reason for Referral: L shoulder pain  Referred By: Imelda  General Comment: visit 4    Precautions  Precautions Comment: none    Patient perform today's treatment with soreness      Pain  Pain Assessment: 0-10  0-10 (Numeric) Pain Score: 0 - No pain  Pain Type: Acute pain  Pain Location: Shoulder  Pain Orientation: Left    Objective:      Treatments:   This therapist instructed and demonstrated interventions to patient, patient completed the following under direct supervision of this therapist:    25 minutes  Therapeutic Exercise  Therapeutic Exercise Performed: Yes  Therapeutic Exercise Activity 1: shoulder pulleys flexion and scaption x3 mins each  Therapeutic Exercise Activity 2: Supine ABC  x 2  Therapeutic Exercise Activity 3: Supine Flexion to 90: 2 x 12  Therapeutic Exercise Activity 4: side lying Abd 15 x 1  Therapeutic Exercise Activity 5: side lying ER 15 x 2 with end range pain  Therapeutic Exercise Activity 6: standing ER/IR with yellow tband: 2 x 15 each     minutes       13 minutes  Manual Therapy  Manual Therapy Performed: Yes  Manual Therapy Activity 1: Seated: Trigger point release to the bilateral upper traps, with DTM to the left upper trap           Education:      Eddie Calderon, PT

## 2024-11-22 ENCOUNTER — OFFICE VISIT (OUTPATIENT)
Dept: ORTHOPEDIC SURGERY | Facility: CLINIC | Age: 54
End: 2024-11-22
Payer: COMMERCIAL

## 2024-11-22 DIAGNOSIS — M25.512 CHRONIC LEFT SHOULDER PAIN: Primary | ICD-10-CM

## 2024-11-22 DIAGNOSIS — G89.29 CHRONIC LEFT SHOULDER PAIN: Primary | ICD-10-CM

## 2024-11-22 DIAGNOSIS — M25.819 SHOULDER IMPINGEMENT: ICD-10-CM

## 2024-11-22 PROCEDURE — 99212 OFFICE O/P EST SF 10 MIN: CPT

## 2024-11-22 ASSESSMENT — PAIN - FUNCTIONAL ASSESSMENT: PAIN_FUNCTIONAL_ASSESSMENT: 0-10

## 2024-11-22 ASSESSMENT — PAIN DESCRIPTION - DESCRIPTORS: DESCRIPTORS: ACHING;SHARP;SHOOTING

## 2024-11-22 ASSESSMENT — PAIN SCALES - GENERAL: PAINLEVEL_OUTOF10: 10 - WORST POSSIBLE PAIN

## 2024-11-22 NOTE — PROGRESS NOTES
DALILA Rocha is a 54 y.o. female in office today for follow up of side: left shoulder pain.  she has been working with PT for the last 6 weeks and is seeing no improvement in pain.  We also did a steroid injection last time and she states it only helped for a week or two.  Still taking ibuprofen occasionally for increased periods of pain.  No new injury or trauma since last visit.    Last PT note reviewed by myself prior to appointment.      Physical Exam  Constitutional: well developed, well nourished female in no acute distress  Psychiatric: normal mood, appropriate affect  Eyes: sclera anicteric  HENT: normocephalic/atraumatic  CV: regular rate and rhythm   Respiratory: non labored breathing  Integumentary: no rash  Neurological: moves all extremities    Left Shoulder Exam     Tenderness   Left shoulder tenderness location: RTC and lateral shoulder.    Range of Motion   Active abduction:  160 (pain above 90)   External rotation:  80   Forward flexion:  170   Internal rotation 0 degrees:  Mid thoracic     Muscle Strength   Abduction: 4/5     Tests   Apprehension: negative  Carreon test: positive  Drop arm: negative    Other   Erythema: absent  Scars: absent  Sensation: normal     Comments:  +Neer            Imaging/Lab:  X-rays were taken 9/20/24 which were reviewed by myself and read by radiology and show Mild left glenohumeral and acromioclavicular osteoarthritis. No fracture or lesion.    Assessment  Assessment: chronic left shoulder pain, left shoulder impingement    Plan  Plan:  History, physical exam, and imaging were reviewed with patient. Patient has failed conservative treatment of PT, injections, RICE, antiinflammatories to this point with no significant improvement in her pain of the left shoulder.  Entered orders for MRI to assess for RTC pathology.  Follow Up: Will follow up with patient after MRI completed.  Will refer to Dr Angela if appropriate based on results.    All questions were  answered for the patient prior to end of exam and patient addressed their understanding.    Brunilda Segura PA-C  11/22/24

## 2024-12-05 DIAGNOSIS — E66.1 CLASS 1 DRUG-INDUCED OBESITY WITHOUT SERIOUS COMORBIDITY WITH BODY MASS INDEX (BMI) OF 33.0 TO 33.9 IN ADULT: ICD-10-CM

## 2024-12-05 DIAGNOSIS — E66.811 CLASS 1 DRUG-INDUCED OBESITY WITHOUT SERIOUS COMORBIDITY WITH BODY MASS INDEX (BMI) OF 33.0 TO 33.9 IN ADULT: ICD-10-CM

## 2024-12-05 RX ORDER — SEMAGLUTIDE 2.4 MG/.75ML
2.4 INJECTION, SOLUTION SUBCUTANEOUS
Qty: 9 ML | Refills: 0 | Status: SHIPPED | OUTPATIENT
Start: 2024-12-08 | End: 2025-02-24

## 2024-12-06 ENCOUNTER — TELEPHONE (OUTPATIENT)
Dept: ORTHOPEDIC SURGERY | Facility: CLINIC | Age: 54
End: 2024-12-06
Payer: COMMERCIAL

## 2024-12-06 ENCOUNTER — HOSPITAL ENCOUNTER (OUTPATIENT)
Dept: RADIOLOGY | Facility: HOSPITAL | Age: 54
Discharge: HOME | End: 2024-12-06
Payer: COMMERCIAL

## 2024-12-06 DIAGNOSIS — M25.819 SHOULDER IMPINGEMENT: ICD-10-CM

## 2024-12-06 DIAGNOSIS — G89.29 CHRONIC LEFT SHOULDER PAIN: ICD-10-CM

## 2024-12-06 DIAGNOSIS — M25.512 CHRONIC LEFT SHOULDER PAIN: ICD-10-CM

## 2024-12-06 PROCEDURE — 73221 MRI JOINT UPR EXTREM W/O DYE: CPT | Mod: LT

## 2024-12-06 NOTE — TELEPHONE ENCOUNTER
Spoke with patient per Brunilda Segura PA-C that she does have some left shoulder RTC tearing and labral tearing, she is referred to Dr Angela for further treatment    Admission Reconciliation is Not Complete  Discharge Reconciliation is Not Complete Admission Reconciliation is Not Complete  Discharge Reconciliation is Completed

## 2024-12-11 ENCOUNTER — APPOINTMENT (OUTPATIENT)
Dept: ORTHOPEDIC SURGERY | Facility: CLINIC | Age: 54
End: 2024-12-11
Payer: COMMERCIAL

## 2024-12-11 VITALS — HEIGHT: 68 IN | BODY MASS INDEX: 32.74 KG/M2 | WEIGHT: 216 LBS

## 2024-12-11 DIAGNOSIS — M75.42 IMPINGEMENT SYNDROME OF LEFT SHOULDER: ICD-10-CM

## 2024-12-11 DIAGNOSIS — M75.22 BICEPS TENDINITIS OF LEFT SHOULDER: ICD-10-CM

## 2024-12-11 DIAGNOSIS — S43.432A LABRAL TEAR OF SHOULDER, LEFT, INITIAL ENCOUNTER: ICD-10-CM

## 2024-12-11 DIAGNOSIS — M75.112 INCOMPLETE TEAR OF LEFT ROTATOR CUFF, UNSPECIFIED WHETHER TRAUMATIC: Primary | ICD-10-CM

## 2024-12-11 ASSESSMENT — PAIN - FUNCTIONAL ASSESSMENT: PAIN_FUNCTIONAL_ASSESSMENT: NO/DENIES PAIN

## 2024-12-11 NOTE — PROGRESS NOTES
54-year-old female with 1-1/2 years of left shoulder pain.  Patient states there is no specific injury but she is already getting pain in her shoulders.  The pains been getting worse.  She is already treated the shoulder pain with ice heat anti-inflammatories a cortisone injection into the left shoulder with no improvement and  6 sessions of physical therapy that made the shoulder pain worse and she has been doing home exercises which does not help.  She states she is left-handed and has significant pain in the left shoulder that interferes with her activities of daily life    Patients' self reported past medical history, medications, allergies, surgical history, family and social history as well as a 10 point review of systems has been documented in the new patient intake form and scanned into the patient's electronic medical record.  The intake form was reviewed by Dr Angela during the office visit and signed by Dr. Angela and the patient.  Pertinent findings are documented in the HPI.    General Multi-System Physical Exam:  Constitutional  General appearance:  Alert, oriented, and in no acute distress.  Well developed, well nourished.  Head and Face  Head and face:  Normocephalic and atraumatic.  Ears, Nose, Mouth, and Throat  External inspection of ears and nose: Normal.  Eyes:  Pupils are equal and round.  Neck  Neck:  no neck mass was observed.  Pulmonary  Respiratory effort:  no respiratory distress.  Cardiovascular  Intact distal pulses.  Lymphatic  Palpation of lymph nodes in the affected extremity:  Normal.  Skin  Skin and subcutaneous tissue:  Normal skin color and pigmentation.  Normal skin turgor.  No rashes.  Neurologic  Sensation:  normal to light touch.  Psychiatric  Judgement and insight:  Intact.  Mood and affect:  Normal.  Musculoskeletal  Left shoulder is 140 degrees of abduction forward flexion 70 degrees of external rotation internal rotates to T10 she has positive biceps tenderness negative  acromioclavicular joint tenderness positive Neer positive Carreon positive Jobes with pain and weakness.  Neurovasc intact left upper extremity.      X-rays of the patient were ordered by Dr Angela and obtained today.  Dr Angela personally reviewed the results of the x-rays.    In addition, Dr Angela independently interpreted the patient's x-rays (performed by the Radiology department) by viewing the x-ray images and this is Dr. Angela's personal interpretation:     Normal x-rays left shoulder      Dr Angela independently interpreted the patient's MRI (performed by the Radiology department) by viewing the MRI images and this is Dr. 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  We talked about conservative treatment however she is already failed physical therapy steroid injections and anti-inflammatories.  We talked about a left shoulder scope with rotator cuff debridement and repair biceps tenodesis extensive debridement subacromial decompression partial acromioplasty.  Patient wants to proceed with surgery on January 23 signed appropriate surgical consents.  See her back for surgery sooner if necessary        I, Dr. Angela, had a long discussion with the patient / patient's family regarding the risks versus benefits of surgery and all of the treatment options, including nonoperative treatment and surgical treatment options. We discussed the risks of surgery.      The risks of surgery include, but are not limited to, nerve damage, artery damage, muscle damage, risk of bleeding or infection, risk of bone fracture, risk of post-operative stiffness, risk of failure of the surgery with possible continued pain or possible need for additional surgery.  Other risks include the risk of hardware  pain and possible need for removal of the surgical hardware at another time.   The risks of undergoing anesthesia including, but are not limited to, stroke, heart attack or death.      After a long discussion, the patient / patient's family understood all of the risks versus benefits of surgery and decided that they wanted to proceed with surgery. The patient / guardian signed appropriate surgical consent forms.    Since this patient will be undergoing surgery, I expect the patient to be in significant additional pain in the immediate postoperative period as a result of the surgical procedure. Non-opioid pain medications will not be sufficient to treat this acute, sharp, postoperative pain. Therefore we will be prescribing the patient narcotic pain medications for the immediate postoperative period in addition to numerous non-narcotic pain medications in order to try to help the patient with their post-operative pain.  However, as the pain from surgery subsides, we will work diligently to wean the patient off of all narcotics as quickly as possible.   If the patient requires more narcotic pain medications than we typically see with patients undergoing this surgery, we will refer the patient to a pain management specialist within the first few weeks after their surgical procedure. The patient's OARRS report was reviewed within the last 90 days.        This patient has had longstanding pain and weakness in their affected extremity which has gotten significantly worse over the last few months.  Non-operative treatment has failed to help this patient and the pain is severe enough to warrent surgery as the appropriate treatment at this time.  That would classify this problem as a chronic illness with severe exacerbation and progression.    We discussed their surgery at great length.  This is an elective major surgery which is warranted in this case due to the patient's failure of non-operative treatment and their  significant level of pain and progression of the disease.  We discussed identified patient and procedural risk factors and how these risk factors may increase the risk of the surgery or influence the outcome of the surgical procedure.  With this procedure, procedural risk factors include, but are not limited to, the risk of infection, bleeding, possible nerve or vasculature injury, calvin fracture, and the possible risk of continued pain or weakness after the operation.  We also discussed how delaying surgery and continuing non-operative treatment may lead to a progression of the disease and high risk of further morbidity.

## 2024-12-20 ENCOUNTER — TELEMEDICINE CLINICAL SUPPORT (OUTPATIENT)
Dept: NUTRITION | Facility: HOSPITAL | Age: 54
End: 2024-12-20
Payer: COMMERCIAL

## 2024-12-20 VITALS — HEIGHT: 68 IN | BODY MASS INDEX: 32.84 KG/M2

## 2024-12-20 DIAGNOSIS — E66.811 CLASS 1 DRUG-INDUCED OBESITY WITHOUT SERIOUS COMORBIDITY WITH BODY MASS INDEX (BMI) OF 33.0 TO 33.9 IN ADULT: ICD-10-CM

## 2024-12-20 DIAGNOSIS — E66.1 CLASS 1 DRUG-INDUCED OBESITY WITHOUT SERIOUS COMORBIDITY WITH BODY MASS INDEX (BMI) OF 33.0 TO 33.9 IN ADULT: ICD-10-CM

## 2024-12-20 PROCEDURE — 97802 MEDICAL NUTRITION INDIV IN: CPT

## 2024-12-20 NOTE — PATIENT INSTRUCTIONS
Follow Mediterranean diet consuming a variety of vegetables and fruits, nuts, legumes, olive oil, moderate lean meats like fish, seafood, and dairy.   Practice the MyPlate method by having ½ of the place filled with vegetables, ¼ of the plate with a portion size grain, and ¼ the plate with protein.   Choose high fiber foods like whole grains, beans, nuts, oats, lentils, berries, non-starchy vegetables, leafy greens, fruits with edible skin, and seeds.  Engage in 30 minutes of moderate exercise at least 4 days per week like brisk walking or strength training.

## 2024-12-20 NOTE — PROGRESS NOTES
"Nutrition Assessment     Reason for Visit:  Yadi Rocha is a 54 y.o. female who presents for Weight Loss    Anthropometrics:  Anthropometrics  Height: 172.7 cm (5' 8\")  Weight:  (216lb per pt chart)  IBW/kg (Dietitian Calculated): 63.6 kg (Hamwi)  Weight Change  Weight History / % Weight Change: 34lb (13%) intentional loss in 16 months  Significant Weight Loss: No       Wt Readings from Last 10 Encounters:   12/11/24 98 kg (216 lb)   09/20/24 98.4 kg (217 lb)   09/16/24 98.4 kg (217 lb)   09/09/24 100 kg (221 lb)   02/19/24 101 kg (223 lb)   11/16/23 106 kg (233 lb 12.8 oz)   08/10/23 114 kg (250 lb 6.4 oz)   07/10/23 113 kg (249 lb)   06/16/22 104 kg (230 lb)   09/29/21 115 kg (254 lb)       Food And Nutrient Intake:  Food and Nutrient History  Food and Nutrient History: Met with pt virtually today. She states she is having trouble losing weight. She has been on Wegovy for a little over a year and has lost 44lb, but has been at a platue for the past 4-6 months. She reports following a healthy diet and tracks her food intake using the Lose It rodri. She follows serving sizes and measures out foods. Pt will be having rotator cuff surger in the new year so movement will be decreased. Pt reports emotional eating on the weekends when she is not on her regular schedule.  Energy Intake: Good > 75 %  Fluid Intake: Water  Food Allergy: NKFA  Food Intolerance: Seafood  GI Symptoms: none (Ocassional constipation.)     Food Intake  Meal 1: Breakfast- Optic protein shake with banana, PB2 and 1% milk  Meal 2: Lunch- leftovers or cottage cheese with ritz crackers and fruit  Meal 3: Dinner- Chicken or ground beef with pasta or rice  Snacks: Protein bar, candy, or sweet.    Food Preparation  Cooking: Patient  Grocery Shopping: Patient  Dining Out: Rare to None     Micronutrient Intake  Vitamin Intake: D, B12  Mineral/Element Intake: Magnesium    Food Supplement Intake  Oral Nutrition Supplements: Premier Protein (Optic " "Protein.)    Medication and Complementary/Alternative Medicine Use  Prescription Medication Use: Wegovy    Current Outpatient Medications on File Prior to Visit   Medication Sig Dispense Refill    buPROPion (Wellbutrin) 75 mg tablet Take 1 tablet (75 mg) by mouth 2 times a day. 60 tablet 5    cholecalciferol (Vitamin D3) 50 mcg (2,000 unit) capsule Take by mouth.      cyanocobalamin, vitamin B-12, 1,000 mcg/mL drops Take by mouth.      inulin (Fiber Gummies) 2 gram tablet,chewable Chew.      Wegovy 2.4 mg/0.75 mL pen injector Inject 2.4 mg under the skin 1 (one) time per week for 12 doses. 9 mL 0     No current facility-administered medications on file prior to visit.     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  Consistency: Yes     Nutrition Focused Physical Exam:  Subcutaneous Fat Loss  Orbital Fat Pads: Defer (Virtual visit. Pt with no signs of malnutrition.)  Muscle Wasting  Temporalis: Defer (Virtual visit. Pt with no signs of malnutrition.)    Energy Needs  Calculated Energy Needs Using Equations  Height: 172.7 cm (5' 8\")  Weight Used for Equation Calculations: 98 kg (216 lb)  John Abdi Equation (Overweight or Obese Patients): 1623  Equation Chosen to Use by RD: Lolita Merida  Activity Factor: 1.3  Total Energy Needs: 2110  Estimated Energy Needs  Total Energy Estimated Needs (kCal): 1610 kCal  Method for Estimating Needs: MSJ x AF 1.3 - 500kcal for weight loss  Estimated Fluid Needs  Total Fluid Estimated Needs (mL): 2110 mL  Method for Estimating Needs: 1ml/kcal  Estimated Protein Needs  Total Protein Estimated Needs (g): 98 g  Method for Estimating Needs: 1g/kg actual BW      Nutrition Diagnosis      Nutrition Diagnosis  Patient has Nutrition Diagnosis: Yes  Diagnosis Status (1): New  Nutrition Diagnosis 1: Inadequate fiber intake  Related to (1): food and nutrition related knowledge deficit  As Evidenced by (1): limited " whole grains and vegetables in diet pet pt recall.    Nutrition Interventions/Recommendations   Nutrition Prescription  Individualized Nutrition Prescription Provided for : Mediterranean diet, Portion Controlled diet, MyPlate Method, Increased fiber  Food and Nutrition Delivery  Meals & Snacks: General Healthful Diet, Modify Composition of Meals/Snacks, Energy-modified diet, Fiber-modified diet  Goals: Follow mediterranean diet with 3 meals per day and up to 2 snacks per day at consistent times while following serving sizes on the food label. Follow the MyPlate Method for meals and snacks. Pt will focus on fiber intake by increasing fruit intake to 2x/day, vegetables to 2-3x/day, and switch CHO options to whole grains as able.  Nutrition Education  Nutrition Education Content: Content related nutrition education  Goals: Nutrition education handouts on Mediterranean diet, snack ideas, MyPlate Method emailed to patient. Patient goals: 1. Increase vegetable intake to 2-3x/day. 2. Track intake and identify fiber to reach 25g per day.      Patient Instructions   Follow Mediterranean diet consuming a variety of vegetables and fruits, nuts, legumes, olive oil, moderate lean meats like fish, seafood, and dairy.   Practice the MyPlate method by having ½ of the place filled with vegetables, ¼ of the plate with a portion size grain, and ¼ the plate with protein.   Choose high fiber foods like whole grains, beans, nuts, oats, lentils, berries, non-starchy vegetables, leafy greens, fruits with edible skin, and seeds.  Engage in 30 minutes of moderate exercise at least 4 days per week like brisk walking or strength training.    Nutrition Monitoring and Evaluation   Food/Nutrient Related History Monitoring  Monitoring and Evaluation Plan: Meal/snack pattern, Amount of food, Fiber intake  Amount of Food: Estimated amout of food  Criteria: Follow serving sizes by reading food labels and measuring foods.  Meal/Snack Pattern:  Estimated meal and snack pattern  Criteria: Consume 3 meals and up to 2 snacks per day while following the Mediterranean diet and MyPlate Method.  Estimated fiber intake: Estimated fiber intake  Criteria: Consume at least 25g fiber per day by increaseing fruits, vegetables, and whole grains.  Additional Plan: Evaluate nutrition intervention as compared to nutrition goal(s) and estimated nutrient need criteria.  Body Composition/Growth/Weight History  Monitoring and Evaluation Plan: Weight  Weight: Measured weight  Criteria: Monitor weight changes.      Time Spent  Time spent directly with patient, family or caregiver: 30 minutes  Documentation Time: 15 minutes  Other Time Spent: 5 minutes        Readiness to Change : Good  Level of Understanding : Good  Anticipated Compliant : Good

## 2025-01-08 ENCOUNTER — APPOINTMENT (OUTPATIENT)
Dept: PRIMARY CARE | Facility: CLINIC | Age: 55
End: 2025-01-08
Payer: COMMERCIAL

## 2025-01-08 VITALS — HEART RATE: 96 BPM | BODY MASS INDEX: 33.31 KG/M2 | HEIGHT: 68 IN | WEIGHT: 219.8 LBS | OXYGEN SATURATION: 98 %

## 2025-01-08 DIAGNOSIS — Z00.00 ANNUAL PHYSICAL EXAM: ICD-10-CM

## 2025-01-08 DIAGNOSIS — E66.1 CLASS 1 DRUG-INDUCED OBESITY WITHOUT SERIOUS COMORBIDITY WITH BODY MASS INDEX (BMI) OF 33.0 TO 33.9 IN ADULT: ICD-10-CM

## 2025-01-08 DIAGNOSIS — E66.01 MORBID OBESITY (MULTI): Primary | ICD-10-CM

## 2025-01-08 DIAGNOSIS — E66.811 CLASS 1 DRUG-INDUCED OBESITY WITHOUT SERIOUS COMORBIDITY WITH BODY MASS INDEX (BMI) OF 33.0 TO 33.9 IN ADULT: ICD-10-CM

## 2025-01-08 PROCEDURE — 99396 PREV VISIT EST AGE 40-64: CPT | Performed by: STUDENT IN AN ORGANIZED HEALTH CARE EDUCATION/TRAINING PROGRAM

## 2025-01-08 PROCEDURE — 99213 OFFICE O/P EST LOW 20 MIN: CPT | Performed by: STUDENT IN AN ORGANIZED HEALTH CARE EDUCATION/TRAINING PROGRAM

## 2025-01-08 PROCEDURE — 3008F BODY MASS INDEX DOCD: CPT | Performed by: STUDENT IN AN ORGANIZED HEALTH CARE EDUCATION/TRAINING PROGRAM

## 2025-01-08 RX ORDER — SEMAGLUTIDE 2.4 MG/.75ML
2.4 INJECTION, SOLUTION SUBCUTANEOUS
Qty: 9 ML | Refills: 0 | Status: SHIPPED | OUTPATIENT
Start: 2025-01-12 | End: 2025-03-31

## 2025-01-08 ASSESSMENT — PATIENT HEALTH QUESTIONNAIRE - PHQ9
2. FEELING DOWN, DEPRESSED OR HOPELESS: NOT AT ALL
SUM OF ALL RESPONSES TO PHQ9 QUESTIONS 1 AND 2: 0
1. LITTLE INTEREST OR PLEASURE IN DOING THINGS: NOT AT ALL

## 2025-01-08 NOTE — PROGRESS NOTES
History Of Present Illness  Yadi Rocha is a 54 y.o. female presents to the office for cpe.      Mild URI   Rhinorrhea     Obesity   Insurance coverage on the [facility] is changing  Following with weight loss couch   BM 33.42 kg/m2  Wellbutrin had severe cramping     1/23/25 will be getting left rotator cuff repair  with Dr. Angela    Due for PAP     Past Medical History  She has a past medical history of Other conditions influencing health status and Personal history of other complications of pregnancy, childbirth and the puerperium.    Surgical History  She has a past surgical history that includes Other surgical history (04/10/2013); Other surgical history (06/04/2021); and MR angio head wo IV contrast (4/12/2016).     Social History  She reports that she has never smoked. She has never used smokeless tobacco. She reports that she does not currently use alcohol. She reports that she does not use drugs.    Family History  Family History   Problem Relation Name Age of Onset    Breast cancer Mother  50 - 59    Lung cancer Mother      No Known Problems Father          Allergies  Hydrocodone-acetaminophen    Review of Systems     Physical Exam  Vitals reviewed.   Constitutional:       General: She is not in acute distress.     Appearance: She is not ill-appearing.   HENT:      Right Ear: Tympanic membrane and ear canal normal.      Left Ear: Tympanic membrane and ear canal normal.      Mouth/Throat:      Mouth: Mucous membranes are moist.      Pharynx: Oropharynx is clear. No oropharyngeal exudate or posterior oropharyngeal erythema.   Eyes:      Extraocular Movements: Extraocular movements intact.      Conjunctiva/sclera: Conjunctivae normal.      Pupils: Pupils are equal, round, and reactive to light.   Neck:      Vascular: No carotid bruit.   Cardiovascular:      Rate and Rhythm: Normal rate and regular rhythm.      Heart sounds: No murmur heard.     No gallop.   Pulmonary:      Effort: Pulmonary effort is  normal.      Breath sounds: Normal breath sounds. No wheezing, rhonchi or rales.   Abdominal:      General: Abdomen is flat. Bowel sounds are normal.      Palpations: Abdomen is soft.      Tenderness: There is no abdominal tenderness.   Musculoskeletal:      Cervical back: Neck supple.      Left lower leg: No edema.   Skin:     General: Skin is warm and dry.      Findings: No rash.   Neurological:      General: No focal deficit present.      Mental Status: She is alert and oriented to person, place, and time.      Gait: Gait normal.   Psychiatric:         Mood and Affect: Mood normal.         Behavior: Behavior normal.        Last Recorded Vitals  Pulse 96   Wt 99.7 kg (219 lb 12.8 oz)   SpO2 98%     Relevant Results      Current Outpatient Medications:     cholecalciferol (Vitamin D3) 50 mcg (2,000 unit) capsule, Take by mouth., Disp: , Rfl:     cyanocobalamin, vitamin B-12, 1,000 mcg/mL drops, Take by mouth., Disp: , Rfl:     inulin (Fiber Gummies) 2 gram tablet,chewable, Chew., Disp: , Rfl:     [START ON 1/12/2025] semaglutide, weight loss, (Wegovy) 2.4 mg/0.75 mL pen injector, Inject 2.4 mg under the skin 1 (one) time per week for 12 doses., Disp: 9 mL, Rfl: 0    Assessment/Plan   Diagnoses and all orders for this visit:  Morbid obesity (Multi)  Class 1 drug-induced obesity without serious comorbidity with body mass index (BMI) of 33.0 to 33.9 in adult  -     semaglutide, weight loss, (Wegovy) 2.4 mg/0.75 mL pen injector; Inject 2.4 mg under the skin 1 (one) time per week for 12 doses.    Annual CPE   Pt to return for PAP completion   UTD with Dental and vision visits   Mammogram UTD      Left Rotator cuff tear  Continue meloxicam 15mg daily for pain management   1/23/25 will be getting left rotator cuff repair  with Dr. Angela  S/p physical therapy     Obesity   Improving   Continue ozempic 2.4 weekly   Physical exam was stable. Discussed maintaining diets such as DASH to help maintain normal Blood pressure.  Encouraged regular exercise for a total of 120 min weekly. We will fu labs in 1-3 days. For now there will be no change in medical management. All questions and concerns were addressed. Pt will follow up in 4-6 months or sooner if needed.     Health Maintenance   Topic Date Due    Yearly Adult Physical  Never done    HIV Screening  Never done    MMR Vaccines (1 of 1 - Standard series) Never done    Hepatitis C Screening  Never done    Hepatitis B Vaccines (1 of 3 - 19+ 3-dose series) Never done    Pneumococcal Vaccine (1 of 1 - PCV) Never done    Cervical Cancer Screening  11/27/2021    Influenza Vaccine (1) 09/01/2024    COVID-19 Vaccine (1 - 2024-25 season) Never done    Mammogram  09/16/2025    Diabetes Screening  02/19/2027    Lipid Panel  02/19/2029    DTaP/Tdap/Td Vaccines (2 - Td or Tdap) 03/25/2033    Colorectal Cancer Screening  01/25/2034    Zoster Vaccines  Completed    HIB Vaccines  Aged Out    IPV Vaccines  Aged Out    Hepatitis A Vaccines  Aged Out    Meningococcal Vaccine  Aged Out    Rotavirus Vaccines  Aged Out    HPV Vaccines  Aged Out            Toshia Lawson DO

## 2025-01-09 ENCOUNTER — PRE-ADMISSION TESTING (OUTPATIENT)
Dept: PREADMISSION TESTING | Facility: HOSPITAL | Age: 55
End: 2025-01-09
Payer: COMMERCIAL

## 2025-01-09 VITALS
RESPIRATION RATE: 18 BRPM | SYSTOLIC BLOOD PRESSURE: 128 MMHG | BODY MASS INDEX: 33.48 KG/M2 | HEART RATE: 87 BPM | WEIGHT: 220.9 LBS | OXYGEN SATURATION: 96 % | DIASTOLIC BLOOD PRESSURE: 87 MMHG | TEMPERATURE: 99 F | HEIGHT: 68 IN

## 2025-01-09 DIAGNOSIS — Z01.818 PREOPERATIVE EXAMINATION: Primary | ICD-10-CM

## 2025-01-09 DIAGNOSIS — S43.432A LABRAL TEAR OF SHOULDER, LEFT, INITIAL ENCOUNTER: ICD-10-CM

## 2025-01-09 DIAGNOSIS — M75.22 BICEPS TENDINITIS OF LEFT SHOULDER: ICD-10-CM

## 2025-01-09 DIAGNOSIS — M75.42 IMPINGEMENT SYNDROME OF LEFT SHOULDER: ICD-10-CM

## 2025-01-09 DIAGNOSIS — M75.112 INCOMPLETE TEAR OF LEFT ROTATOR CUFF, UNSPECIFIED WHETHER TRAUMATIC: ICD-10-CM

## 2025-01-09 LAB
ALBUMIN SERPL BCP-MCNC: 4.1 G/DL (ref 3.4–5)
ALP SERPL-CCNC: 68 U/L (ref 33–110)
ALT SERPL W P-5'-P-CCNC: 14 U/L (ref 7–45)
ANION GAP SERPL CALC-SCNC: 12 MMOL/L (ref 10–20)
AST SERPL W P-5'-P-CCNC: 16 U/L (ref 9–39)
ATRIAL RATE: 84 BPM
BASOPHILS # BLD AUTO: 0.05 X10*3/UL (ref 0–0.1)
BASOPHILS NFR BLD AUTO: 0.6 %
BILIRUB SERPL-MCNC: 0.6 MG/DL (ref 0–1.2)
BUN SERPL-MCNC: 12 MG/DL (ref 6–23)
CALCIUM SERPL-MCNC: 8.6 MG/DL (ref 8.6–10.3)
CHLORIDE SERPL-SCNC: 105 MMOL/L (ref 98–107)
CO2 SERPL-SCNC: 25 MMOL/L (ref 21–32)
CREAT SERPL-MCNC: 0.58 MG/DL (ref 0.5–1.05)
EGFRCR SERPLBLD CKD-EPI 2021: >90 ML/MIN/1.73M*2
EOSINOPHIL # BLD AUTO: 0.06 X10*3/UL (ref 0–0.7)
EOSINOPHIL NFR BLD AUTO: 0.7 %
ERYTHROCYTE [DISTWIDTH] IN BLOOD BY AUTOMATED COUNT: 12 % (ref 11.5–14.5)
GLUCOSE SERPL-MCNC: 101 MG/DL (ref 74–99)
HCT VFR BLD AUTO: 40.8 % (ref 36–46)
HGB BLD-MCNC: 13.8 G/DL (ref 12–16)
IMM GRANULOCYTES # BLD AUTO: 0.03 X10*3/UL (ref 0–0.7)
IMM GRANULOCYTES NFR BLD AUTO: 0.3 % (ref 0–0.9)
LYMPHOCYTES # BLD AUTO: 1.13 X10*3/UL (ref 1.2–4.8)
LYMPHOCYTES NFR BLD AUTO: 13.1 %
MCH RBC QN AUTO: 29.6 PG (ref 26–34)
MCHC RBC AUTO-ENTMCNC: 33.8 G/DL (ref 32–36)
MCV RBC AUTO: 87 FL (ref 80–100)
MONOCYTES # BLD AUTO: 0.68 X10*3/UL (ref 0.1–1)
MONOCYTES NFR BLD AUTO: 7.9 %
NEUTROPHILS # BLD AUTO: 6.68 X10*3/UL (ref 1.2–7.7)
NEUTROPHILS NFR BLD AUTO: 77.4 %
NRBC BLD-RTO: 0 /100 WBCS (ref 0–0)
P AXIS: 37 DEGREES
P OFFSET: 180 MS
P ONSET: 127 MS
PLATELET # BLD AUTO: 242 X10*3/UL (ref 150–450)
POTASSIUM SERPL-SCNC: 3.8 MMOL/L (ref 3.5–5.3)
PR INTERVAL: 178 MS
PROT SERPL-MCNC: 6.5 G/DL (ref 6.4–8.2)
Q ONSET: 216 MS
QRS COUNT: 14 BEATS
QRS DURATION: 72 MS
QT INTERVAL: 380 MS
QTC CALCULATION(BAZETT): 449 MS
QTC FREDERICIA: 424 MS
R AXIS: 19 DEGREES
RBC # BLD AUTO: 4.67 X10*6/UL (ref 4–5.2)
SODIUM SERPL-SCNC: 138 MMOL/L (ref 136–145)
T AXIS: 33 DEGREES
T OFFSET: 406 MS
VENTRICULAR RATE: 84 BPM
WBC # BLD AUTO: 8.6 X10*3/UL (ref 4.4–11.3)

## 2025-01-09 PROCEDURE — 36415 COLL VENOUS BLD VENIPUNCTURE: CPT

## 2025-01-09 PROCEDURE — 85025 COMPLETE CBC W/AUTO DIFF WBC: CPT

## 2025-01-09 PROCEDURE — 93005 ELECTROCARDIOGRAM TRACING: CPT

## 2025-01-09 PROCEDURE — 80053 COMPREHEN METABOLIC PANEL: CPT

## 2025-01-09 PROCEDURE — 99203 OFFICE O/P NEW LOW 30 MIN: CPT | Performed by: NURSE PRACTITIONER

## 2025-01-09 ASSESSMENT — DUKE ACTIVITY SCORE INDEX (DASI)
DASI METS SCORE: 8.2
CAN YOU DO HEAVY WORK AROUND THE HOUSE LIKE SCRUBBING FLOORS OR LIFTING AND MOVING HEAVY FURNITURE: YES
CAN YOU PARTICIPATE IN STRENOUS SPORTS LIKE SWIMMING, SINGLES TENNIS, FOOTBALL, BASKETBALL, OR SKIING: NO
CAN YOU DO LIGHT WORK AROUND THE HOUSE LIKE DUSTING OR WASHING DISHES: YES
CAN YOU WALK INDOORS, SUCH AS AROUND YOUR HOUSE: YES
CAN YOU TAKE CARE OF YOURSELF (EAT, DRESS, BATHE, OR USE TOILET): YES
CAN YOU HAVE SEXUAL RELATIONS: YES
CAN YOU PARTICIPATE IN MODERATE RECREATIONAL ACTIVITIES LIKE GOLF, BOWLING, DANCING, DOUBLES TENNIS OR THROWING A BASEBALL OR FOOTBALL: NO
CAN YOU DO MODERATE WORK AROUND THE HOUSE LIKE VACUUMING, SWEEPING FLOORS OR CARRYING GROCERIES: YES
CAN YOU DO YARD WORK LIKE RAKING LEAVES, WEEDING OR PUSHING A MOWER: YES
CAN YOU WALK A BLOCK OR TWO ON LEVEL GROUND: YES
CAN YOU CLIMB A FLIGHT OF STAIRS OR WALK UP A HILL: YES
TOTAL_SCORE: 44.7
CAN YOU RUN A SHORT DISTANCE: YES

## 2025-01-09 ASSESSMENT — ENCOUNTER SYMPTOMS
NEUROLOGICAL NEGATIVE: 1
COUGH: 1
CARDIOVASCULAR NEGATIVE: 1
NECK NEGATIVE: 1
EYES NEGATIVE: 1
CONSTITUTIONAL NEGATIVE: 1
GASTROINTESTINAL NEGATIVE: 1

## 2025-01-09 ASSESSMENT — LIFESTYLE VARIABLES: SMOKING_STATUS: NONSMOKER

## 2025-01-09 ASSESSMENT — PAIN - FUNCTIONAL ASSESSMENT: PAIN_FUNCTIONAL_ASSESSMENT: 0-10

## 2025-01-09 ASSESSMENT — PAIN SCALES - GENERAL: PAINLEVEL_OUTOF10: 0 - NO PAIN
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
## 2025-01-09 NOTE — PREPROCEDURE INSTRUCTIONS
Thank you for visiting Preadmission Testing (PAT) today for your pre-procedure evaluation, you were seen by     Sommer Mathias CNP  Pre Admission Testing  [facility]  [phone]    This summary includes instructions and information to aid you during your perioperative period.  Please read carefully. If you have any questions about your visit today, please call the number listed above.  If you become ill or have any changes to your health before your surgery, please contact your primary care provider and alert your surgeon.        Preparing for your Surgery       Exercises  Preoperative Deep Breathing Exercises  Why it is important to do deep breathing exercises before my surgery?  Deep breathing exercises strengthen your breathing muscles.  This helps you to recover after your surgery and decreases the chance of breathing complications.  How are the deep breathing exercises done?  Sit straight with your back supported.  Breathe in deeply and slowly through your nose. Your lower rib cage should expand and your abdomen may move forward.  Hold that breath for 3 to 5 seconds.  Breathe out through pursed lips, slowly and completely.  Rest and repeat 10 times every hour while awake.  Rest longer if you become dizzy or lightheaded.       Preoperative Brain Exercises    What are brain exercises?  A brain exercise is any activity that engages your thinking (cognitive) skills.    What types of activities are considered brain exercises?  Jigsaw puzzles, crossword puzzles, word jumble, memory games, word search, and many more.  Many can be found free online or on your phone via a mobile rodri.    Why should I do brain exercises before my surgery?  More recent research has shown brain exercise before surgery can lower the risk of postoperative delirium (confusion) which can be especially important for older adults.  Patients who did brain exercises for 5 to 10 hours the days before surgery, cut their  risk of postoperative delirium in half up to 1 week after surgery.    Sit-to-Stand Exercise    What is the sit-to-stand exercise?  The sit-to-stand exercise strengthens the muscles of your lower body and muscles in the center of your body (core muscles for stability) helping to maintain and improve your strength and mobility.  How do I do the sit-to-stand exercise?  The goal is to do this exercise without using your arms or hands.  If this is too difficult, use your arms and hands or a chair with armrests to help slowly push yourself to the standing position and lower yourself back to the sitting position. As the movement becomes easier use your arms and hands less.    Steps to the sit-to-stand exercise  Sit up tall in a sturdy chair, knees bent, feet flat on the floor shoulder-width apart.  Shift your hips/pelvis forward in the chair to correctly position yourself for the next movement.  Lean forward at your hips.  Stand up straight putting equal weight on both feet.  Check to be sure you are properly aligned with the chair, in a slow controlled movement sit back down.  Repeat this exercise 10-15 times.  If needed you can do it fewer times until your strength improves.  Rest for 1 minute.  Do another 10-15 sit-to-stand exercises.  Try to do this in the morning and evening.        Instructions    Preoperative Fasting Guidelines    Why must I stop eating and drinking near surgery time?  With sedation, food or liquid in your stomach can enter your lungs causing serious complications  Food can increase nausea and vomiting  When do I need to stop eating and drinking before my surgery?      Do not eat any food after midnight the night before your surgery/procedure. You may have up to 13.5 ounces of clear liquid until TWO hours before your instructed arrival time to the hospital.  This includes water, black tea/coffee, (no milk or cream) apple juice, and electrolyte drinks (Gatorade). You may chew gum until TWO hours  before your surgery/procedure            Simple things you can do to help prevent blood clots     Blood clots are blockages that can form in the body's veins. When a blood clot forms in your deep veins, it may be called a deep vein thrombosis, or DVT for short. Blood clots can happen in any part of the body where blood flows, but they are most common in the arms and legs. If a piece of a blood clot breaks free and travels to the lungs, it is called a pulmonary embolus (PE). A PE can be a very serious problem.         Being in the hospital or having surgery can raise your chances of getting a blood clot because you may not be well enough to move around as much as you normally do.         Ways you can help prevent blood clots in the hospital       Wearing SCDs  SCDs stands for Sequential Compression Devices.   SCDs are special sleeves that wrap around your legs. They attach to a pump that fills them with air to gently squeeze your legs every few minutes.  This helps return the blood in your legs to your heart.   SCDs should only be taken off when walking or bathing. SCDs may not be comfortable, but they can help save your life.              Pump SCD leg sleeves  Wearing compression stockings - if your doctor orders them. These special snug-fitting stockings gently squeeze your legs to help blood flow.       Walking. Walking helps move the blood in your legs.   If your doctor says it is ok, try walking the halls at least   5 times a day. Ask us to help you get up, so you don't fall.      Taking any blood-thinning medicines your doctor orders.              Ways you can help prevent blood clots at home         Wearing compression stockings - if your doctor orders them.   Walking - to help move the blood in your legs.    Taking any blood-thinning medicines your doctor orders.      Signs of a blood clot or PE    Tell your doctor or nurse right away if you have any of the problems listed below.         If you are at home,  seek medical care right away. Call 911 for chest pain or problems breathing.            Signs of a blood clot (DVT) - such as pain, swelling, redness, or warmth in your arm or legs.  Signs of a pulmonary embolism (PE) - such as chest pain or feeling short of breath      Tobacco and Alcohol;  Do not drink alcohol or smoke within 24 hours of surgery.  It is best to quit smoking for as long as possible before any surgery or procedure.      The Week before Surgery        Seven days before Surgery  Check your PAT medication instructions  Do the exercises provided to you by PAT  Arrange for a responsible, adult licensed  to take you home after surgery and stay with you for 24 hours.  You will not be permitted to drive yourself home if you have received any anesthetic/sedation  Six days before surgery  Check your PAT medication instructions  Do the exercises provided to you by PAT  Start using Chlorhexidene (CHG) body wash if prescribed  Five days before surgery  Check your PAT medication instructions  Do the exercises provided to you by PAT  Continue to use CHG body wash if prescribed  Three days before surgery  Check your PAT medication instructions  Do the exercises provided to you by PAT  Continue to use CHG body wash if prescribed  Two days before surgery  Check your PAT medication instructions  Do the exercises provided to you by PAT  Continue to use CHG body wash if prescribed    The Day before Surgery       Check your PAT medication and all other PAT instructions including when to stop eating and drinking  You will be called with your arrival time for surgery in the late afternoon.  If you do not receive a call please reach out to [facility]. [phone]  Do not smoke or drink 24 hours before surgery  Prepare items to bring with you to the hospital  Shower with your chlorhexidine wash if prescribed  Brush your teeth and use your chlorhexidine dental rinse if prescribed    The Day of Surgery       Check  your PAT medication instructions  Ensure you follow the instructions for when to stop eating and drinking  Shower, if prescribed use CHG.  Do not apply any lotions, creams, moisturizers, perfume or deodorant  Brush your teeth and use your CHG dental rinse if prescribed  Wear loose comfortable clothing  Avoid make-up  Remove  jewelry and piercings, consider professional piercing removal with a plastic spacer if needed  Bring photo ID and Insurance card  Bring an accurate medication list that includes medication dose, frequency and allergies  Bring a copy of your advanced directives (will, health care power of )  Bring any devices and controllers as well as medical devices you have been provided with for surgery (CPAP, slings, braces, etc.)  Dentures, eyeglasses, and contacts will be removed before surgery, please bring cases for contacts or glasses
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
## 2025-01-14 ENCOUNTER — TELEPHONE (OUTPATIENT)
Dept: ORTHOPEDIC SURGERY | Facility: CLINIC | Age: 55
End: 2025-01-14
Payer: COMMERCIAL

## 2025-01-14 DIAGNOSIS — M75.112 INCOMPLETE TEAR OF LEFT ROTATOR CUFF, UNSPECIFIED WHETHER TRAUMATIC: ICD-10-CM

## 2025-01-21 ENCOUNTER — ANESTHESIA EVENT (OUTPATIENT)
Dept: OPERATING ROOM | Facility: HOSPITAL | Age: 55
End: 2025-01-21
Payer: COMMERCIAL
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
## 2025-01-23 ENCOUNTER — PHARMACY VISIT (OUTPATIENT)
Dept: PHARMACY | Facility: CLINIC | Age: 55
End: 2025-01-23
Payer: COMMERCIAL

## 2025-01-23 ENCOUNTER — ANESTHESIA (OUTPATIENT)
Dept: OPERATING ROOM | Facility: HOSPITAL | Age: 55
End: 2025-01-23
Payer: COMMERCIAL

## 2025-01-23 ENCOUNTER — HOSPITAL ENCOUNTER (OUTPATIENT)
Facility: HOSPITAL | Age: 55
Setting detail: OUTPATIENT SURGERY
Discharge: HOME | End: 2025-01-23
Attending: ORTHOPAEDIC SURGERY | Admitting: ORTHOPAEDIC SURGERY
Payer: COMMERCIAL

## 2025-01-23 VITALS
SYSTOLIC BLOOD PRESSURE: 142 MMHG | WEIGHT: 219.58 LBS | HEART RATE: 61 BPM | TEMPERATURE: 96.8 F | DIASTOLIC BLOOD PRESSURE: 89 MMHG | HEIGHT: 68 IN | BODY MASS INDEX: 33.28 KG/M2 | RESPIRATION RATE: 15 BRPM | OXYGEN SATURATION: 93 %

## 2025-01-23 DIAGNOSIS — M75.112 INCOMPLETE TEAR OF LEFT ROTATOR CUFF, UNSPECIFIED WHETHER TRAUMATIC: Primary | ICD-10-CM

## 2025-01-23 DIAGNOSIS — M75.42 IMPINGEMENT SYNDROME OF LEFT SHOULDER: ICD-10-CM

## 2025-01-23 DIAGNOSIS — M75.22 BICEPS TENDINITIS OF LEFT SHOULDER: ICD-10-CM

## 2025-01-23 DIAGNOSIS — S43.432A LABRAL TEAR OF SHOULDER, LEFT, INITIAL ENCOUNTER: ICD-10-CM

## 2025-01-23 PROCEDURE — 3600000003 HC OR TIME - INITIAL BASE CHARGE - PROCEDURE LEVEL THREE: Performed by: ORTHOPAEDIC SURGERY

## 2025-01-23 PROCEDURE — 2500000004 HC RX 250 GENERAL PHARMACY W/ HCPCS (ALT 636 FOR OP/ED): Performed by: ANESTHESIOLOGY

## 2025-01-23 PROCEDURE — 29827 SHO ARTHRS SRG RT8TR CUF RPR: CPT

## 2025-01-23 PROCEDURE — 29826 SHO ARTHRS SRG DECOMPRESSION: CPT | Performed by: ORTHOPAEDIC SURGERY

## 2025-01-23 PROCEDURE — 29828 SHO ARTHRS SRG BICP TENODSIS: CPT

## 2025-01-23 PROCEDURE — 7100000009 HC PHASE TWO TIME - INITIAL BASE CHARGE: Performed by: ORTHOPAEDIC SURGERY

## 2025-01-23 PROCEDURE — 3700000001 HC GENERAL ANESTHESIA TIME - INITIAL BASE CHARGE: Performed by: ORTHOPAEDIC SURGERY

## 2025-01-23 PROCEDURE — 2720000007 HC OR 272 NO HCPCS: Performed by: ORTHOPAEDIC SURGERY

## 2025-01-23 PROCEDURE — 3600000008 HC OR TIME - EACH INCREMENTAL 1 MINUTE - PROCEDURE LEVEL THREE: Performed by: ORTHOPAEDIC SURGERY

## 2025-01-23 PROCEDURE — 29823 SHO ARTHRS SRG XTNSV DBRDMT: CPT | Performed by: ORTHOPAEDIC SURGERY

## 2025-01-23 PROCEDURE — 64415 NJX AA&/STRD BRCH PLXS IMG: CPT | Performed by: ANESTHESIOLOGY

## 2025-01-23 PROCEDURE — 7100000002 HC RECOVERY ROOM TIME - EACH INCREMENTAL 1 MINUTE: Performed by: ORTHOPAEDIC SURGERY

## 2025-01-23 PROCEDURE — 2500000004 HC RX 250 GENERAL PHARMACY W/ HCPCS (ALT 636 FOR OP/ED): Performed by: ORTHOPAEDIC SURGERY

## 2025-01-23 PROCEDURE — A29827 PR SHLDR ARTHROSCOP,SURG,W/ROTAT CUFF REPR: Performed by: NURSE ANESTHETIST, CERTIFIED REGISTERED

## 2025-01-23 PROCEDURE — RXMED WILLOW AMBULATORY MEDICATION CHARGE

## 2025-01-23 PROCEDURE — 3700000002 HC GENERAL ANESTHESIA TIME - EACH INCREMENTAL 1 MINUTE: Performed by: ORTHOPAEDIC SURGERY

## 2025-01-23 PROCEDURE — 29828 SHO ARTHRS SRG BICP TENODSIS: CPT | Performed by: ORTHOPAEDIC SURGERY

## 2025-01-23 PROCEDURE — A29827 PR SHLDR ARTHROSCOP,SURG,W/ROTAT CUFF REPR: Performed by: ANESTHESIOLOGY

## 2025-01-23 PROCEDURE — C1713 ANCHOR/SCREW BN/BN,TIS/BN: HCPCS | Performed by: ORTHOPAEDIC SURGERY

## 2025-01-23 PROCEDURE — 29823 SHO ARTHRS SRG XTNSV DBRDMT: CPT

## 2025-01-23 PROCEDURE — 2500000004 HC RX 250 GENERAL PHARMACY W/ HCPCS (ALT 636 FOR OP/ED): Performed by: NURSE ANESTHETIST, CERTIFIED REGISTERED

## 2025-01-23 PROCEDURE — 29826 SHO ARTHRS SRG DECOMPRESSION: CPT

## 2025-01-23 PROCEDURE — 7100000001 HC RECOVERY ROOM TIME - INITIAL BASE CHARGE: Performed by: ORTHOPAEDIC SURGERY

## 2025-01-23 PROCEDURE — 7100000010 HC PHASE TWO TIME - EACH INCREMENTAL 1 MINUTE: Performed by: ORTHOPAEDIC SURGERY

## 2025-01-23 PROCEDURE — 2780000003 HC OR 278 NO HCPCS: Performed by: ORTHOPAEDIC SURGERY

## 2025-01-23 PROCEDURE — 29827 SHO ARTHRS SRG RT8TR CUF RPR: CPT | Performed by: ORTHOPAEDIC SURGERY

## 2025-01-23 DEVICE — MILAGRO ADVANCE INTERFERENCE SCREW ABSORBABLE - TCP/PLGA 8 X 23MM
Type: IMPLANTABLE DEVICE | Site: SHOULDER | Status: FUNCTIONAL
Brand: MILAGRO

## 2025-01-23 DEVICE — IMPLANTABLE DEVICE
Type: IMPLANTABLE DEVICE | Site: SHOULDER | Status: FUNCTIONAL
Brand: VERSALOOP™ ANCHOR 3 SUTURE, 2.5MM

## 2025-01-23 DEVICE — EXPRESSEW III AUTOCAPTURE+ LOADING TOOL/RETENTION PLATE FOR USE WITH E3AC+ FLEXIBLE SUTURE PASSER
Type: IMPLANTABLE DEVICE | Site: SHOULDER | Status: FUNCTIONAL
Brand: EXPRESSEW

## 2025-01-23 RX ORDER — SODIUM CHLORIDE, SODIUM LACTATE, POTASSIUM CHLORIDE, CALCIUM CHLORIDE 600; 310; 30; 20 MG/100ML; MG/100ML; MG/100ML; MG/100ML
100 INJECTION, SOLUTION INTRAVENOUS CONTINUOUS
Status: ACTIVE | OUTPATIENT
Start: 2025-01-23 | End: 2025-01-23

## 2025-01-23 RX ORDER — ACETAMINOPHEN 325 MG/1
650 TABLET ORAL EVERY 4 HOURS PRN
Status: DISCONTINUED | OUTPATIENT
Start: 2025-01-23 | End: 2025-01-23 | Stop reason: HOSPADM

## 2025-01-23 RX ORDER — ONDANSETRON HYDROCHLORIDE 2 MG/ML
INJECTION, SOLUTION INTRAVENOUS AS NEEDED
Status: DISCONTINUED | OUTPATIENT
Start: 2025-01-23 | End: 2025-01-23

## 2025-01-23 RX ORDER — OXYCODONE HYDROCHLORIDE 5 MG/1
5 TABLET ORAL EVERY 6 HOURS PRN
Qty: 28 TABLET | Refills: 0 | Status: SHIPPED | OUTPATIENT
Start: 2025-01-23

## 2025-01-23 RX ORDER — CEFAZOLIN 1 G/1
INJECTION, POWDER, FOR SOLUTION INTRAVENOUS AS NEEDED
Status: DISCONTINUED | OUTPATIENT
Start: 2025-01-23 | End: 2025-01-23

## 2025-01-23 RX ORDER — MELOXICAM 15 MG/1
15 TABLET ORAL DAILY PRN
Qty: 30 TABLET | Refills: 0 | Status: SHIPPED | OUTPATIENT
Start: 2025-01-23 | End: 2025-02-22

## 2025-01-23 RX ORDER — MIDAZOLAM HYDROCHLORIDE 1 MG/ML
INJECTION, SOLUTION INTRAMUSCULAR; INTRAVENOUS AS NEEDED
Status: DISCONTINUED | OUTPATIENT
Start: 2025-01-23 | End: 2025-01-23

## 2025-01-23 RX ORDER — ROCURONIUM BROMIDE 10 MG/ML
INJECTION, SOLUTION INTRAVENOUS AS NEEDED
Status: DISCONTINUED | OUTPATIENT
Start: 2025-01-23 | End: 2025-01-23

## 2025-01-23 RX ORDER — ONDANSETRON HYDROCHLORIDE 2 MG/ML
8 INJECTION, SOLUTION INTRAVENOUS ONCE
Status: DISCONTINUED | OUTPATIENT
Start: 2025-01-23 | End: 2025-01-23 | Stop reason: HOSPADM

## 2025-01-23 RX ORDER — DOCUSATE SODIUM 100 MG/1
100 CAPSULE, LIQUID FILLED ORAL 2 TIMES DAILY PRN
Qty: 28 CAPSULE | Refills: 0 | Status: SHIPPED | OUTPATIENT
Start: 2025-01-23 | End: 2025-02-06

## 2025-01-23 RX ORDER — ASPIRIN 325 MG
325 TABLET, DELAYED RELEASE (ENTERIC COATED) ORAL 2 TIMES DAILY
Qty: 60 TABLET | Refills: 0 | Status: SHIPPED | OUTPATIENT
Start: 2025-01-23 | End: 2025-02-22

## 2025-01-23 RX ORDER — LIDOCAINE HYDROCHLORIDE 10 MG/ML
INJECTION, SOLUTION EPIDURAL; INFILTRATION; INTRACAUDAL; PERINEURAL AS NEEDED
Status: DISCONTINUED | OUTPATIENT
Start: 2025-01-23 | End: 2025-01-23

## 2025-01-23 RX ORDER — ACETAMINOPHEN 500 MG
1000 TABLET ORAL EVERY 8 HOURS PRN
Qty: 90 TABLET | Refills: 0 | Status: SHIPPED | OUTPATIENT
Start: 2025-01-23

## 2025-01-23 RX ORDER — GABAPENTIN 300 MG/1
300 CAPSULE ORAL NIGHTLY
Qty: 5 CAPSULE | Refills: 0 | Status: SHIPPED | OUTPATIENT
Start: 2025-01-23 | End: 2025-01-28

## 2025-01-23 RX ORDER — FENTANYL CITRATE 50 UG/ML
INJECTION, SOLUTION INTRAMUSCULAR; INTRAVENOUS AS NEEDED
Status: DISCONTINUED | OUTPATIENT
Start: 2025-01-23 | End: 2025-01-23

## 2025-01-23 RX ORDER — CEFAZOLIN SODIUM 2 G/100ML
2 INJECTION, SOLUTION INTRAVENOUS ONCE
Status: DISCONTINUED | OUTPATIENT
Start: 2025-01-23 | End: 2025-01-23 | Stop reason: HOSPADM

## 2025-01-23 RX ORDER — PROPOFOL 10 MG/ML
INJECTION, EMULSION INTRAVENOUS AS NEEDED
Status: DISCONTINUED | OUTPATIENT
Start: 2025-01-23 | End: 2025-01-23

## 2025-01-23 RX ORDER — ALBUTEROL SULFATE 0.83 MG/ML
2.5 SOLUTION RESPIRATORY (INHALATION) ONCE AS NEEDED
Status: DISCONTINUED | OUTPATIENT
Start: 2025-01-23 | End: 2025-01-23 | Stop reason: HOSPADM

## 2025-01-23 RX ORDER — GLYCOPYRROLATE 0.2 MG/ML
INJECTION INTRAMUSCULAR; INTRAVENOUS AS NEEDED
Status: DISCONTINUED | OUTPATIENT
Start: 2025-01-23 | End: 2025-01-23

## 2025-01-23 RX ADMIN — LIDOCAINE HYDROCHLORIDE 50 MG: 10 INJECTION, SOLUTION EPIDURAL; INFILTRATION; INTRACAUDAL; PERINEURAL at 07:58

## 2025-01-23 RX ADMIN — CEFAZOLIN 2 G: 1 INJECTION, POWDER, FOR SOLUTION INTRAMUSCULAR; INTRAVENOUS at 08:08

## 2025-01-23 RX ADMIN — ROCURONIUM BROMIDE 50 MG: 10 INJECTION, SOLUTION INTRAVENOUS at 07:58

## 2025-01-23 RX ADMIN — SODIUM CHLORIDE, POTASSIUM CHLORIDE, SODIUM LACTATE AND CALCIUM CHLORIDE 100 ML/HR: 600; 310; 30; 20 INJECTION, SOLUTION INTRAVENOUS at 07:00

## 2025-01-23 RX ADMIN — FENTANYL CITRATE 50 MCG: 50 INJECTION, SOLUTION INTRAMUSCULAR; INTRAVENOUS at 07:58

## 2025-01-23 RX ADMIN — ONDANSETRON 4 MG: 2 INJECTION, SOLUTION INTRAMUSCULAR; INTRAVENOUS at 08:26

## 2025-01-23 RX ADMIN — GLYCOPYRROLATE 0.2 MG: 0.2 INJECTION, SOLUTION INTRAMUSCULAR; INTRAVENOUS at 08:14

## 2025-01-23 RX ADMIN — PROPOFOL 140 MG: 10 INJECTION, EMULSION INTRAVENOUS at 07:58

## 2025-01-23 RX ADMIN — DEXAMETHASONE SODIUM PHOSPHATE 4 MG: 4 INJECTION INTRA-ARTICULAR; INTRALESIONAL; INTRAMUSCULAR; INTRAVENOUS; SOFT TISSUE at 08:26

## 2025-01-23 RX ADMIN — FENTANYL CITRATE 50 MCG: 50 INJECTION, SOLUTION INTRAMUSCULAR; INTRAVENOUS at 08:14

## 2025-01-23 RX ADMIN — ROCURONIUM BROMIDE 10 MG: 10 INJECTION, SOLUTION INTRAVENOUS at 08:59

## 2025-01-23 RX ADMIN — MIDAZOLAM 4 MG: 1 INJECTION INTRAMUSCULAR; INTRAVENOUS at 07:20

## 2025-01-23 RX ADMIN — SUGAMMADEX 200 MG: 100 INJECTION, SOLUTION INTRAVENOUS at 09:27

## 2025-01-23 SDOH — HEALTH STABILITY: MENTAL HEALTH: CURRENT SMOKER: 0

## 2025-01-23 ASSESSMENT — PAIN SCALES - GENERAL
PAINLEVEL_OUTOF10: 0 - NO PAIN
PAIN_LEVEL: 2
PAINLEVEL_OUTOF10: 0 - NO PAIN

## 2025-01-23 ASSESSMENT — PAIN - FUNCTIONAL ASSESSMENT
PAIN_FUNCTIONAL_ASSESSMENT: 0-10
PAIN_FUNCTIONAL_ASSESSMENT: 0-10

## 2025-01-23 ASSESSMENT — COLUMBIA-SUICIDE SEVERITY RATING SCALE - C-SSRS
6. HAVE YOU EVER DONE ANYTHING, STARTED TO DO ANYTHING, OR PREPARED TO DO ANYTHING TO END YOUR LIFE?: NO
1. IN THE PAST MONTH, HAVE YOU WISHED YOU WERE DEAD OR WISHED YOU COULD GO TO SLEEP AND NOT WAKE UP?: NO
2. HAVE YOU ACTUALLY HAD ANY THOUGHTS OF KILLING YOURSELF?: NO

## 2025-01-23 NOTE — ANESTHESIA PROCEDURE NOTES
Peripheral Block    Patient location during procedure: pre-op  Start time: 1/23/2025 7:20 AM  End time: 1/23/2025 7:30 AM  Reason for block: at surgeon's request and post-op pain management  Staffing  Performed: attending   Authorized by: Philip Raymond MD    Performed by: Philip Raymond MD  Preanesthetic Checklist  Completed: patient identified, IV checked, site marked, risks and benefits discussed, surgical consent, monitors and equipment checked, pre-op evaluation and timeout performed   Timeout performed at: 1/23/2025 7:00 AM  Peripheral Block  Patient position: laying flat  Prep: ChloraPrep and site prepped and draped  Patient monitoring: continuous pulse ox, heart rate and cardiac monitor  Block type: interscalene  Injection technique: single-shot  Guidance: nerve stimulator and ultrasound guided  Local infiltration: lidocaine  Infiltration strength: 1 %  Dose: 3 mL  Needle  Needle gauge: 20 G  Needle localization: ultrasound guidance     image stored in chart  Assessment  Injection assessment: negative aspiration for heme, local visualized surrounding nerve on ultrasound, no paresthesia on injection, incremental injection and transient paresthesias  Paresthesia pain: none  Heart rate change: no  Slow fractionated injection: yes  Additional Notes  L Interscalene Block  Time out verbal and written consent  Sterile tech, wide prep w chlorhex  US guided  Lido local   Prakash 0.375% + epi 1:200K+ decadron 5 mg+ tetracaine 20mg  35 ml  No Complications  Versed 4 mg

## 2025-01-23 NOTE — OP NOTE
TENODESIS, TENDON, BICEPS, AT SHOULDER, ARTHROSCOPIC SHOULDER ARTHROSCOPY, ROTATOR CUFF REPAIR, BICEPS TENODESIS, EXTENSIVE DEBRIDEMENT, SUBACROMIAL DECOMPRESSION WITH PARTIAL ACROMIOPLASTY (L) Operative Note     Date: 2025  OR Location: [facility] OR    Name: Yadi Rocha, : 1970, Age: 54 y.o., MRN: [id number], Sex: female    ORTHOPEDIC OPERATIVE REPORT / POST-OP NOTE    SURGEON:  Meet Angela MD  ASSISTANT(S):  Elizabeth Berger PA-C  PRE-OPERATIVE DIAGNOSIS: Left shoulder partial rotator cuff tear, biceps tendinopathy, labral tearing, subacromial impingement  POST-OPERATIVE DIAGNOSIS:  Same  PROCEDURE: Left shoulder arthroscopy, arthroscopic rotator cuff repair of the supraspinatus, arthroscopic biceps tenodesis, extensive debridement, subacromial decompression with partial acromioplasty  CPT CODE(S):  05835, 92739, 89671, 26029  ANESTHESIA:  general + regional  ESTIMATED BLOOD LOSS: Minimal  SPECIMEN:  None  FINDINGS:  Above  COMPLICATIONS:  None  CONDITION:  Stable to the Recovery Room      I, Dr Angela, was present and scrubbed for the entire surgical procedure, including wound closure.     No qualified Orthopedic Resident was available to assist with this procedure.  Therefore, the assistance of Elizabeth Berger PA-C, was required throughout this entire procedure.      Elizabeth Berger PA-C, is specifically trained and experienced in assisting with this procedure.  During the procedure, she actively assisted Dr. Angela in completing the operation safely and expeditiously by helping to provide exposure, maintain hemostasis, and served other technical functions, such as suturing, assisting in drilling, etc.    We will be billing for Elizabeth Berger PA-C, as a required First Assistant for this procedure.      INDICATION FOR SURGERY:  54-year-old female with longstanding left shoulder pain and weakness.  We treated the patient's shoulder pain and weakness conservatively with anti-inflammatories, physical  therapy, and steroid injections.  The patient failed to have significant relief.  X-rays showed a relatively normal shoulder with no significant arthritis of the glenohumeral joint and MRI showed a partial rotator cuff tear in the shoulder, as well as proximal biceps tendinopathy, labral tearing and subacromial impingement.  On physical examination, the patient had pain and weakness with rotator cuff testing, positive impingement signs, and pain over their proximal biceps tendon.  We discussed continuing conservative treatment but this had not been helping and was tried already.  We discussed a shoulder arthroscopy with rotator cuff repair, arthroscopic biceps tenodesis, extensive debridement and subacromial decompression with partial acromioplasty.  The patient understood all the risks versus benefits of operative and non-operative treatment options.  The risks of shoulder arthroscopy with rotator cuff repair were discussed, which included but were not limited to: risk of continued pain or re-tear of the rotator cuff, risks of infection, bleeding, nerve, artery, or muscle damage, risk of fracture either intra-operatively or post-operatively, risk of need for additional surgery, risk of anesthesia including risks of heart attack, stroke, or even death.  The patient wanted to proceed with surgery and signed appropriate surgical consents.  On the morning of surgery, I signed the patient's operative shoulder the preoperative holding area.      PROCEDURE:  The patient was brought to the operating room after anesthesia performed a block on the patient's operative upper extremity.  The patient was placed supine on the operating room table and all of their bony prominences were padded.  A operating room huddle was performed and the patient received IV antibiotics.  The patient was placed into a beachchair position with all of their calvin prominences padded and SCDs were applied to the lower extremities and used throughout  the procedure.   The patient's left upper extremity was prepped and draped in the normal sterile fashion.  A pre-incision timeout was called.  A shoulder arthroscopy was then performed, using the standard posterior viewing portal, anterior, anterolateral, and lateral working portals.  Throughout the shoulder arthroscopy, no loose bodies were identified.   In the glenohumeral joint, the cartilage of the humeral head was relatively well maintained.  There was some grade 2 cartilage wear of the glenoid.  The patient was found to have extensive labral tearing, with anterior and posterior labral tearing, as well as a SLAP tear.  The biceps tendon was found to be erythematous.  The biceps tendon was then tagged with a spinal needle and cut at its insertion on the labrum.  Scar tissue was seen throughout the anterior capsule.  An extensive debridement was then performed with an arthroscopic shaver, debriding the subacromial bursa as well as the labrum tearing anteriorly and posteriorly.  The biceps anchor complex tearing was debrided down to normal healthy labrum.  The biceps tendon stump was then debrided down to normal healthy labrum.  The glenoid cartilage wear was debrided with the arthroscopic shaver.  Scarring in the anterior capsule was debrided with the arthroscopic shaver and the anterior interval was opened up with the shaver.      The subscapularis tendon was then examined.  The subscapularis was intact.    The supraspinatus tendon was then examined.  A partial articular sided tear of the supraspinatus tendon was identified.  The torn tissue of the supraspinatus tendon was then debrided back to healthy bleeding tissue.    The arthroscope was then put in the subacromial bursa and a subacromial decompression was performed.  A prominent hook was found on the anterolateral aspect of the acromion, so a partial acromioplasty was performed using a adrian in the cutting block technique.  The biceps tendon was pulled in  the subacromial space and freed up down to the bottom of the bicipital groove, where an 8 mm tunnel was drilled.  The biceps tendon was pushed into the tunnel and secured with interference screw, completing the arthroscopic biceps tenodesis.      Attention was then turned to the torn supraspinatus tendon.  The supraspinatus tendon was probed from the bursal side and found to have poor quality tissue with the probe falling through the rotator cuff and into the glenohumeral joint indicating poor quality tissue.  Therefore, the rotator cuff tear was completed with arthroscopic shaver.  The unhealthy supraspinatus tendon tissue was debrided back to healthy, bleeding tendon tissue with an arthroscopic shaver and the footprint of the supraspinatus was debrided down to healthy bleeding bone.  A supraspinatus rotator cuff repair was then performed using a triply loaded all suture anchor.  The sutures were passed up through the supraspinatus tendon and tied down, completing the rotator cuff repair of the supraspinatus.  After this was performed, a probe was used and the rotator cuff repair of the supraspinatus was found to be extremely stable.  The shoulder was also put through a full range of motion and the rotator cuff repair was found to be extremely stable.    After the shoulder arthroscopy was completed, including the rotator cuff repair, biceps tenodesis, extensive debridement, and subacromial decompression with partial acromioplasty, a complete shoulder arthroscopy was then again performed and no further pathology was identified.  The shoulder was drained of fluid and the arthroscopic portals were closed with 3-0 nylon skin sutures.  Adaptic, dry sterile dressing, and a shoulder immobilizer were applied.  The patient was awoken and brought to the recovery room in good condition.  There were no complications.

## 2025-01-23 NOTE — ANESTHESIA PROCEDURE NOTES
Airway  Date/Time: 1/23/2025 8:03 AM  Urgency: elective    Airway not difficult    Staffing  Performed: CRNA   Authorized by: Philip Raymond MD    Performed by: TONIA Dunn-MIRNA  Patient location during procedure: OR    Indications and Patient Condition  Indications for airway management: anesthesia  Spontaneous Ventilation: absent  Sedation level: deep  Preoxygenated: yes  Patient position: sniffing  Mask difficulty assessment: 1 - vent by mask    Final Airway Details  Final airway type: endotracheal airway      Successful airway: ETT  Cuffed: yes   Successful intubation technique: video laryngoscopy  Facilitating devices/methods: intubating stylet  Endotracheal tube insertion site: oral  Blade size: #3  ETT size (mm): 7.5  Cormack-Lehane Classification: grade I - full view of glottis  Number of attempts at approach: 1

## 2025-01-23 NOTE — ANESTHESIA POSTPROCEDURE EVALUATION
Patient: Yadi Rocha    Procedure Summary       Date: 01/23/25 Room / Location: [facility] / [facility]    Anesthesia Start: 0753 Anesthesia Stop: 0945    Procedure: TENODESIS, TENDON, BICEPS, AT SHOULDER, ARTHROSCOPIC SHOULDER ARTHROSCOPY, ROTATOR CUFF REPAIR, BICEPS TENODESIS, EXTENSIVE DEBRIDEMENT, SUBACROMIAL DECOMPRESSION WITH PARTIAL ACROMIOPLASTY (Left: Shoulder) Diagnosis:       Incomplete tear of left rotator cuff, unspecified whether traumatic      Biceps tendinitis of left shoulder      Impingement syndrome of left shoulder      Labral tear of shoulder, left, initial encounter      (Incomplete tear of left rotator cuff, unspecified whether traumatic [M75.112])      (Biceps tendinitis of left shoulder [M75.22])      (Impingement syndrome of left shoulder [M75.42])      (Labral tear of shoulder, left, initial encounter [S43.432A])    Surgeons: LARA Angela MD Responsible Provider: Philip Raymond MD    Anesthesia Type: general, regional ASA Status: 3            Anesthesia Type: general, regional    Vitals Value Taken Time   /80 01/23/25 1012   Temp 36 °C (96.8 °F) 01/23/25 0942   Pulse 68 01/23/25 1012   Resp 14 01/23/25 1012   SpO2 93 % 01/23/25 1012       Anesthesia Post Evaluation    Patient location during evaluation: PACU  Patient participation: complete - patient participated  Level of consciousness: awake  Pain score: 2  Pain management: adequate  Multimodal analgesia pain management approach  Airway patency: patent  Two or more strategies used to mitigate risk of obstructive sleep apnea  Cardiovascular status: acceptable  Respiratory status: acceptable  Hydration status: acceptable  Postoperative Nausea and Vomiting: none    No notable events documented.

## 2025-01-27 ENCOUNTER — TELEPHONE (OUTPATIENT)
Dept: ORTHOPEDIC SURGERY | Facility: CLINIC | Age: 55
End: 2025-01-27
Payer: COMMERCIAL

## 2025-01-27 NOTE — LETTER
Date: 2025  RE:  Yadi Rocha  :  1970      To Whom It May Concern:    Our patient, Yadi, has been under our care and now may return back to work without restrictions.    Their return to work date is: 25    If you have questions concerning this patient's immediate care, please feel free to contact our office at [phone].    Sincerely,      Marisabel Varma  Supervising Provider: Srinivas Ho M.D.

## 2025-01-27 NOTE — TELEPHONE ENCOUNTER
1/23/25 lt shoulder scope  Patient needs work note to keep her off for the next 5 weeks. Am I able to type that up?  [email or web address]

## 2025-02-05 ENCOUNTER — APPOINTMENT (OUTPATIENT)
Dept: ORTHOPEDIC SURGERY | Facility: CLINIC | Age: 55
End: 2025-02-05
Payer: COMMERCIAL

## 2025-02-05 ENCOUNTER — TELEPHONE (OUTPATIENT)
Dept: PRIMARY CARE | Facility: CLINIC | Age: 55
End: 2025-02-05

## 2025-02-05 VITALS — BODY MASS INDEX: 33.19 KG/M2 | HEIGHT: 68 IN | WEIGHT: 219 LBS

## 2025-02-05 DIAGNOSIS — Z98.890 STATUS POST LEFT ROTATOR CUFF REPAIR: Primary | ICD-10-CM

## 2025-02-05 PROCEDURE — 99024 POSTOP FOLLOW-UP VISIT: CPT

## 2025-02-05 PROCEDURE — 1036F TOBACCO NON-USER: CPT

## 2025-02-05 PROCEDURE — 3008F BODY MASS INDEX DOCD: CPT

## 2025-02-05 ASSESSMENT — PAIN - FUNCTIONAL ASSESSMENT: PAIN_FUNCTIONAL_ASSESSMENT: 0-10

## 2025-02-05 ASSESSMENT — PAIN SCALES - GENERAL: PAINLEVEL_OUTOF10: 3

## 2025-02-05 NOTE — PATIENT INSTRUCTIONS
BMI was above normal measurement. Current weight: 99.3 kg (219 lb)  Weight change since last visit (-) denotes wt loss -0.58 lbs   Weight loss needed to achieve BMI 25: 54.9 Lbs  Weight loss needed to achieve BMI 30: 22.1 Lbs  Advised to Increase physical activity.

## 2025-02-05 NOTE — PROGRESS NOTES
History of Present Illness  Chief Complaint   Patient presents with    Left Shoulder - Post-op     1/23/25 LT SHOULDER SCOPE, RTC REPAIR, BICEPS TENODESIS       54 y.o. female is 2 weeks status post left shoulder scope with rotator cuff repair and bicep tenodesis.  Patient states that her pain is tolerable at this point.  She notes that she is taking Tylenol and Oxy at night.  She denies any fever chills or drainage from incision.  No chest pain or shortness of breath that she endorses today.  No radicular symptoms that she reports.  She denies any calf pain.  She continues to wear his sling at all times.  Overall she does feel like it is slowly improving each day..  State they are continuing to progress well.    Review of Systems   GENERAL: Negative for malaise, significant weight loss, fever, chills  MUSCULOSKELETAL: see HPI  NEURO:  Negative    Exam  Left shoulder incisions are clean dry intact well-healed. No evidence of infection today. Stitches were removed in the office today.  Seen on placement at today.  No tenderness palpation on exam today.  No swelling noted.  Patient has mild discomfort with gentle range of motion of the left shoulder today.   strength is symmetric bilaterally failure radial pulses 2+ bilaterally.  SILT. UE is NVI.    Assessment  Patient is 2 weeks status post left shoulder scope with rotator cuff repair, biceps tenodesis.  Patient is doing satisfactory from postoperative standpoint at this point    Plan  Overall, the patient is doing very well after their surgery and is pleased with their progress. Today we removed the stitches and applied Steri-Strips. It is okay for the patient to shower but avoid soaking the wounds (no pools or bathtubs) for 3-4 more weeks until the wounds are completely healed.  The patient can slowly increase their activity levels, but must do so slowly to avoid aggravating the joint.  We gave the patient a prescription for physical therapy to work on range of  motion and strengthening of the extremity.  We did discuss with the patient that she will not begin any strengthening exercises until she is 3 months postoperative.  She should continue to wear her sling until she is 6 weeks postop.  She can continue to take Tylenol as needed for pain and meloxicam.  The patient was instructed to call the office if she needs a refill of the oxycodone but was encouraged to wean off of it as soon as possible.  We discussed with her that we can send in 1 refill.  She has not had any yet.  We will plan on seeing the patient back in 8 more weeks. I told the patient to call with any questions or problems.

## 2025-02-11 ENCOUNTER — TELEPHONE (OUTPATIENT)
Dept: ORTHOPEDIC SURGERY | Facility: CLINIC | Age: 55
End: 2025-02-11
Payer: COMMERCIAL

## 2025-02-11 DIAGNOSIS — M75.112 INCOMPLETE TEAR OF LEFT ROTATOR CUFF, UNSPECIFIED WHETHER TRAUMATIC: ICD-10-CM

## 2025-02-11 RX ORDER — OXYCODONE HYDROCHLORIDE 5 MG/1
5 TABLET ORAL EVERY 6 HOURS PRN
Qty: 28 TABLET | Refills: 0 | Status: SHIPPED | OUTPATIENT
Start: 2025-02-11

## 2025-02-11 NOTE — TELEPHONE ENCOUNTER
Patient called asking for a refill of her Oxycodone 5mg, this is her first refill so I sent it to her pharmacy. If patient calls asking for another refill we should refer her to pain management.   [facility] to [facility]   1/23/25 lt shoulder scope, rtc repair, biceps tenodesis.

## 2025-02-17 ENCOUNTER — EVALUATION (OUTPATIENT)
Dept: PHYSICAL THERAPY | Facility: CLINIC | Age: 55
End: 2025-02-17
Payer: COMMERCIAL

## 2025-02-17 DIAGNOSIS — M75.112 INCOMPLETE TEAR OF LEFT ROTATOR CUFF, UNSPECIFIED WHETHER TRAUMATIC: Primary | ICD-10-CM

## 2025-02-17 PROCEDURE — 97161 PT EVAL LOW COMPLEX 20 MIN: CPT | Mod: GP | Performed by: PHYSICAL THERAPIST

## 2025-02-17 PROCEDURE — 97110 THERAPEUTIC EXERCISES: CPT | Mod: GP | Performed by: PHYSICAL THERAPIST

## 2025-02-17 NOTE — PROGRESS NOTES
"Physical Therapy Evaluation and Treatment    Patient Name: Yadi Rocha  MRN: [id number]  Date: 2/17/2025  Time Calculation  Start Time: 0830  Stop Time: 0920  Time Calculation (min): 50 min       PT Therapeutic Procedures Time Entry  Manual Therapy Time Entry: 5  Therapeutic Exercise Time Entry: 20    INSURANCE:  Visit Number: 5  Insurance Type: Payor: [employer] / Plan: ANTHEM TRADITIONAL / Product Type: *No Product type* /   CMS Re-Certification Period:2/17/25 to 5/18/25    CURRENT PROBLEMS:   1. Incomplete tear of left rotator cuff, unspecified whether traumatic  Referral to Physical Therapy    Follow Up In Physical Therapy          PRECAUTIONS:  It is okay for the patient to shower but avoid soaking the wounds (no pools or bathtubs) for 3-4 more weeks until the wounds are completely healed. The patient can slowly increase their activity levels, but must do so slowly to avoid aggravating the joint. We gave the patient a prescription for physical therapy to work on range of motion and strengthening of the extremity. We did discuss with the patient that she will not begin any strengthening exercises until she is 3 months postoperative. She should continue to wear her sling until she is 6 weeks postop.     PMH: (pertinent to PT evaluation)    *See Epic EMR for complete list.    Medical History Form: Reviewed (scanned into chart)      HPI  per doctor note:  \"54 y.o. female is 2 weeks status post left shoulder scope with rotator cuff repair and bicep tenodesis.  Patient states that her pain is tolerable at this point.  She notes that she is taking Tylenol and Oxy at night.  She denies any fever chills or drainage from incision.  No chest pain or shortness of breath that she endorses today.  No radicular symptoms that she reports.  She denies any calf pain.  She continues to wear his sling at all times.  Overall she does feel like it is slowly improving each day..  State they are continuing to progress well.   "     SPECIAL CONCERNS:   Left handed     SUBJECTIVE  The pt has had shoulder pain for over a year and a half. She tried shots and PT but needed surgery. She had the rotator cuff repaired on 1/23/25.  She is in the pillow sling.  She has a lot of pain without  it.  She takes it off only to shower.      She hasn't really slept in weeks.  She is massaging the lower arm and her daughter is moving her elbow for her . She has the most pain with moving out to the side    PAIN:    Current  0 /10 at rest  RANGE: 0 /10  to  10+  /10  Location: L shoulder (L handed)  Description:  sharp, burning, stabbing (pulling if she moves wrong)  Aggravating: wrong move, shoulder drops back, and out of the sling  Reducing: meds       OUTCOME MEASURE  Other Measures  Other Outcome Measures: ASES 0   = 100% impaired in left upper extremity    HOME LIVING:  With  in a multi level house.    Elevated bed.    WORK:   in [employer] and does both paperwork, moving funiture, and helping clients.      PRIOR LEVEL OF FUNCTION  Independent and active.    OBJECTIVE:    POSTURE:  Patient is currently in a sling  Forward head with axial extension,   Rounded shoulders (left)  Elevated shoulders (left)    SHOULDER:           PROM                                                   L             Flexion           20  degrees         Extension           0 degrees         Abduction             60  degrees       ER at neutral      -60  degrees         IR at neutral         to body       SHOULDER  MMT:    NA- no L shoulder strengthening until 6 weeks    ELBOW   ROM:         L           Flexion                  WFL   Extension                  -30             ELBOW   MMT:         L        Flexion            3-/5    Extension    3-/5                  PALPATION:   Tightness and tenderness noted in L UT, L cervical muscles including UT. And throughout the L shoulder including scapular muscles     Skin:  Incision is clean and dry  Rash noted on  "torso under the arm.    TREATMENTS:  Manual Therapy:    STM to L UT and cervical  Gentle L scapular gliding    Therapeutic Exercise:    PROM to the L shoulder in all planes  ** pendulum exercises  **body on arm with arm on counter and stepping back (relative flexion) and stepping sideways (relative abduction)     EDUCATION:  Pt educated in:  + current status, general goals, treatment plan  + use of body on arm for hygiene  + Expectations for therapy  + Benefits of self active assistive and passive range of motion    Home Exercise program:  See \"*\" exercises above. Written instructions issued.  Pt was verbally instructed and demonstrated exercises.    ASSESSMENT  Pt is a 54y.o. Female who presents with impairments of left shoulder pain, edema, reduced strength, reduced ROM/flexibility, and soft tissue restriction following recent left rotator cuff repair surgery.    These impairments have led to functional limitations including    difficulty with sleeping, reaching, lifting, dressing, bathing, housework and work out of the house    Pt would benefit from skilled physical therapy intervention to improve above impairments and facilitate return to function.    Complexity of Evaluation:   Low  Based on the history including personal factors and/or comorbidities, examination of body systems including body structures and function, activity limitations, and/or participation restrictions, as well as clinical presentation, patient meets criteria for a low complexity evaluation.      Rehab potential:  Good    GOALS:  Pt stated goal:  Full use of the left upper extremity    Short term goals:  + Increase left shoulder ROM by 20 degrees  + Reduce pain by 2 points with movement  + Independent in a basic HEP per protocol  + Able to sleep at least 6 hours a night    Long term goals:  + reduce pain to <3/10  + Full AROM in left shoulder as needed for reaching over head  + 5/5 strength in left shoulder and scapular stabilizers for " stability during functional reach and lifting  + Improved posture and independent postural correction for reduced pain and proper biomechanics for optimal shoulder function  + Independent in a HEP for self-management of symptoms.  + Reduce soft tissue restrictions for normal biomechanics and function  + Improve function with reduced reported disability by 10% on ASES  + Able to bathe and dress using the left UE without increased pain  + Able to lift and reach without increased pain or compensation as needed for home and work    PLAN     Skilled physical therapy 2 times per week for 12 weeks  Treatment may include:  Therapeutic Exercise (09920)  Therapeutic Activity (22738)  Manual therapy (85272)  Neuro-muscular re-education (37062)  Ultrasound (66730)  Unattended Electrical Stimulation (/05425)  Attended Electrical Stimulation (45306)  Light therapy (11949)    The pt agreed with above stated treatment plan and general goals.

## 2025-02-19 ENCOUNTER — TREATMENT (OUTPATIENT)
Dept: PHYSICAL THERAPY | Facility: CLINIC | Age: 55
End: 2025-02-19
Payer: COMMERCIAL

## 2025-02-19 DIAGNOSIS — M75.112 INCOMPLETE TEAR OF LEFT ROTATOR CUFF, UNSPECIFIED WHETHER TRAUMATIC: ICD-10-CM

## 2025-02-19 PROCEDURE — 97110 THERAPEUTIC EXERCISES: CPT | Mod: GP | Performed by: PHYSICAL THERAPIST

## 2025-02-19 PROCEDURE — 97140 MANUAL THERAPY 1/> REGIONS: CPT | Mod: GP | Performed by: PHYSICAL THERAPIST

## 2025-02-19 NOTE — PROGRESS NOTES
"Physical Therapy Treatment     Patient Name: Yadi Rocha  MRN: [id number]  Today's Date: 2/19/2025  Time Calculation  Start Time: 1445  Stop Time: 1530  Time Calculation (min): 45 min       PT Therapeutic Procedures Time Entry  Manual Therapy Time Entry: 30  Therapeutic Exercise Time Entry: 15    INSURANCE:  Visit Number:2  Insurance Type: Payor: ANTHEM / Plan: ANTHEM TRADITIONAL / Product Type: *No Product type* /   CMS Re-Certification Period:2/17/25 to 5/18/25    Current Problem  1. Incomplete tear of left rotator cuff, unspecified whether traumatic  Follow Up In Physical Therapy          PRECAUTIONS:  It is okay for the patient to shower but avoid soaking the wounds (no pools or bathtubs) for 3-4 more weeks until the wounds are completely healed. The patient can slowly increase their activity levels, but must do so slowly to avoid aggravating the joint. We gave the patient a prescription for physical therapy to work on range of motion and strengthening of the extremity. We did discuss with the patient that she will not begin any strengthening exercises until she is 3 months postoperative. She should continue to wear her sling until she is 6 weeks postop.      PMH: (pertinent to PT evaluation)     *See Epic EMR for complete list.    Medical History Form: Reviewed (scanned into chart)    PAIN  Start of Treatment: 0/10 at rest in sling, but strong with any movement      SUBJECTIVE  The pt moved too quickly in  the shower as she released her arm and she hurt a lot.  She was feeling a little better yesterday after therapy evaluation.     OBJECTIVE  TREATMENT:  Manual Therapy:    Supine PROM to L shoulder, elbow, forearm, and wrist    Therapeutic Exercise:    *Pendulum exercise     *Standing with UE on table and weight shifting    Lateral   Forward and back    *Table glide in standing   Flexion   Circles    *Ball rolling (small) on table   CW, CCW, forward and back     Home exercise program: (HEP)  Issued \"**\" " "exercise as new HEP with written copy issued.   \"*\" exercises are current HEP    Education/instruction:  New:    Added ball rolling to HEP    Pt educated in:  + current status, general goals, treatment plan  + use of body on arm for hygiene  + Expectations for therapy  + Benefits of self active assistive and passive range of motion     Home Exercise program:  See \"*\" exercises above    PAIN  End of session pain rating:  moderate    ASSESSMENT:  The pt had difficulty with relaxing for PROM.  She was able to progressively relax as treatment progressed and did well with ball rolling exercise.  ROM not as far as with evaluation today.    Skin under arm less irritated.      Compliance with HEP:     good  Assessment of current progress against goals:    insufficient evidence      GOALS:  Pt stated goal:  Full use of the left upper extremity     Short term goals:  + Increase left shoulder ROM by 20 degrees  + Reduce pain by 2 points with movement  + Independent in a basic HEP per protocol  + Able to sleep at least 6 hours a night     Long term goals:  + reduce pain to <3/10  + Full AROM in left shoulder as needed for reaching over head  + 5/5 strength in left shoulder and scapular stabilizers for stability during functional reach and lifting  + Improved posture and independent postural correction for reduced pain and proper biomechanics for optimal shoulder function  + Independent in a HEP for self-management of symptoms.  + Reduce soft tissue restrictions for normal biomechanics and function  + Improve function with reduced reported disability by 10% on ASES  + Able to bathe and dress using the left UE without increased pain  + Able to lift and reach without increased pain or compensation as needed for home and work     PLAN  Consider EMS  Consider rocking on UBE    Skilled physical therapy 2 times per week for 12 weeks  Treatment may include:  Therapeutic Exercise (45538)  Therapeutic Activity (19281)  Manual therapy " (29294)  Neuro-muscular re-education (27277)  Ultrasound (61778)  Unattended Electrical Stimulation (/39522)  Attended Electrical Stimulation (39285)  Light therapy (70729)

## 2025-02-20 ENCOUNTER — APPOINTMENT (OUTPATIENT)
Dept: PRIMARY CARE | Facility: CLINIC | Age: 55
End: 2025-02-20
Payer: COMMERCIAL

## 2025-02-20 VITALS
SYSTOLIC BLOOD PRESSURE: 128 MMHG | DIASTOLIC BLOOD PRESSURE: 84 MMHG | HEIGHT: 68 IN | BODY MASS INDEX: 34.1 KG/M2 | WEIGHT: 225 LBS | OXYGEN SATURATION: 98 % | HEART RATE: 81 BPM

## 2025-02-20 DIAGNOSIS — E66.1 CLASS 1 DRUG-INDUCED OBESITY WITHOUT SERIOUS COMORBIDITY WITH BODY MASS INDEX (BMI) OF 33.0 TO 33.9 IN ADULT: Primary | ICD-10-CM

## 2025-02-20 DIAGNOSIS — E66.811 CLASS 1 DRUG-INDUCED OBESITY WITHOUT SERIOUS COMORBIDITY WITH BODY MASS INDEX (BMI) OF 33.0 TO 33.9 IN ADULT: Primary | ICD-10-CM

## 2025-02-20 LAB
POC AMPHETAMINE: NEGATIVE NG/ML
POC BARBITURATES: NEGATIVE NG/ML
POC BENZODIAZEPINES: NEGATIVE NG/ML
POC BUPRENORPHINE URINE: NEGATIVE NG/ML
POC COCAINE: NEGATIVE NG/ML
POC MDMA (NG/ML) IN URINE: NEGATIVE NG/ML
POC METHADONE MANUALLY ENTERED: NEGATIVE NG/ML
POC METHAMPHETAMINE: NEGATIVE NG/ML
POC MORPHINE URINE: NEGATIVE NG/ML
POC OPIATES: NEGATIVE NG/ML
POC OXYCODONE: POSITIVE NG/ML
POC PHENCYCLIDINE (PCP): NEGATIVE NG/ML
POC THC: NEGATIVE NG/ML
POC TICYCLIC ANTIDEPRESSANTS (TCA): NEGATIVE NG/ML

## 2025-02-20 PROCEDURE — 3008F BODY MASS INDEX DOCD: CPT | Performed by: STUDENT IN AN ORGANIZED HEALTH CARE EDUCATION/TRAINING PROGRAM

## 2025-02-20 PROCEDURE — 99214 OFFICE O/P EST MOD 30 MIN: CPT | Performed by: STUDENT IN AN ORGANIZED HEALTH CARE EDUCATION/TRAINING PROGRAM

## 2025-02-20 PROCEDURE — 80305 DRUG TEST PRSMV DIR OPT OBS: CPT | Performed by: STUDENT IN AN ORGANIZED HEALTH CARE EDUCATION/TRAINING PROGRAM

## 2025-02-20 ASSESSMENT — PATIENT HEALTH QUESTIONNAIRE - PHQ9
1. LITTLE INTEREST OR PLEASURE IN DOING THINGS: NOT AT ALL
2. FEELING DOWN, DEPRESSED OR HOPELESS: NOT AT ALL
SUM OF ALL RESPONSES TO PHQ9 QUESTIONS 1 AND 2: 0

## 2025-02-20 NOTE — PROGRESS NOTES
"Subjective   Patient ID: Yadi Rocha is a 55 y.o. female who presents for follow up for weight loss.     Obesity   Insurance coverage on the [facility] is changing  Following with weight loss couch   BM 33.42 kg/m2  Wellbutrin had severe cramping      1/23/25 s/p left rotator cuff repair  with Dr. Angela    Current Outpatient Medications:     acetaminophen (Tylenol) 500 mg tablet, Take 2 tablets (1,000 mg) by mouth every 8 hours if needed for mild pain (1 - 3) (Take 2 tablets every 8 hours for 5 days, then after 5 days take 2 tablets as needed for pain)., Disp: 90 tablet, Rfl: 0    cholecalciferol (Vitamin D3) 50 mcg (2,000 unit) capsule, Take by mouth., Disp: , Rfl:     cyanocobalamin, vitamin B-12, 1,000 mcg/mL drops, Take by mouth., Disp: , Rfl:     oxyCODONE (Roxicodone) 5 mg immediate release tablet, Take 1 tablet (5 mg) by mouth every 6 hours if needed for severe pain (7 - 10) (Do not take within 3 hours of GABAPENTIN)., Disp: 28 tablet, Rfl: 0    aspirin 325 mg EC tablet, Take 1 tablet (325 mg) by mouth 2 times a day. (Patient not taking: Reported on 2/20/2025), Disp: 60 tablet, Rfl: 0    gabapentin (Neurontin) 300 mg capsule, Take 1 capsule (300 mg) by mouth once daily at bedtime for 5 days., Disp: 5 capsule, Rfl: 0    meloxicam (Mobic) 15 mg tablet, Take 1 tablet (15 mg) by mouth once daily as needed for moderate pain (4 - 6) (pain). (Patient not taking: Reported on 2/20/2025), Disp: 30 tablet, Rfl: 0    semaglutide, weight loss, (Wegovy) 2.4 mg/0.75 mL pen injector, Inject 2.4 mg under the skin 1 (one) time per week for 12 doses. (Patient not taking: Reported on 2/20/2025), Disp: 9 mL, Rfl: 0    Visit Vitals  /84   Pulse 81   Ht 1.727 m (5' 8\")   Wt 102 kg (225 lb)   SpO2 98%   BMI 34.21 kg/m²   OB Status Postmenopausal   Smoking Status Never   BSA 2.21 m²     Wt Readings from Last 15 Encounters:   02/20/25 102 kg (225 lb)   02/05/25 99.3 kg (219 lb)   01/23/25 99.6 kg (219 lb 9.3 oz) "   01/09/25 100 kg (220 lb 14.4 oz)   01/08/25 99.7 kg (219 lb 12.8 oz)   12/11/24 98 kg (216 lb)   09/20/24 98.4 kg (217 lb)   09/16/24 98.4 kg (217 lb)   09/09/24 100 kg (221 lb)   02/19/24 101 kg (223 lb)   11/16/23 106 kg (233 lb 12.8 oz)   08/10/23 114 kg (250 lb 6.4 oz)   07/10/23 113 kg (249 lb)   06/16/22 104 kg (230 lb)   09/29/21 115 kg (254 lb)         Objective   Physical Exam  Vitals reviewed.   Constitutional:       Appearance: Normal appearance.   Cardiovascular:      Rate and Rhythm: Normal rate and regular rhythm.      Heart sounds: No murmur heard.  Pulmonary:      Effort: Pulmonary effort is normal. No respiratory distress.      Breath sounds: Normal breath sounds. No wheezing.   Musculoskeletal:      Cervical back: Neck supple.      Left lower leg: No edema.   Neurological:      Mental Status: She is alert.         Assessment/Plan   Diagnoses and all orders for this visit:  Class 1 drug-induced obesity without serious comorbidity with body mass index (BMI) of 33.0 to 33.9 in adult  -     POCT waived urine drug screen manually resulted       Left Rotator cuff tear  S/p 1/23/25 will be getting left rotator cuff repair  with Dr. Angela  S/p physical therapy      Obesity   Improving   BMI 34.21 kg/m2  GLP-1 agonist is no longer covered by her insurance.   Exercise is not currently an option due to recent surgery  Medications: Wellbutrin (severe leg cramping)  Discussed Topamax and phentermine   Physical exam was stable. Discussed maintaining diets such as DASH to help maintain normal Blood pressure. Encouraged regular exercise for a total of 120 min weekly. We will fu labs in 1-3 days. For now there will be no change in medical management. All questions and concerns were addressed. Pt will follow up in 4-6 months or sooner if needed.        Toshia Lawson DO 02/20/25 1:45 PM

## 2025-02-25 ENCOUNTER — TELEPHONE (OUTPATIENT)
Dept: PRIMARY CARE | Facility: CLINIC | Age: 55
End: 2025-02-25

## 2025-02-25 ENCOUNTER — TREATMENT (OUTPATIENT)
Dept: PHYSICAL THERAPY | Facility: CLINIC | Age: 55
End: 2025-02-25
Payer: COMMERCIAL

## 2025-02-25 DIAGNOSIS — M75.112 INCOMPLETE TEAR OF LEFT ROTATOR CUFF, UNSPECIFIED WHETHER TRAUMATIC: ICD-10-CM

## 2025-02-25 PROCEDURE — 97140 MANUAL THERAPY 1/> REGIONS: CPT | Mod: GP | Performed by: PHYSICAL THERAPIST

## 2025-02-25 PROCEDURE — 97110 THERAPEUTIC EXERCISES: CPT | Mod: GP | Performed by: PHYSICAL THERAPIST

## 2025-02-25 RX ORDER — PHENTERMINE HYDROCHLORIDE 37.5 MG/1
37.5 TABLET ORAL
Qty: 30 TABLET | Refills: 0 | Status: SHIPPED | OUTPATIENT
Start: 2025-02-25 | End: 2025-03-27

## 2025-02-25 NOTE — PROGRESS NOTES
Physical Therapy Treatment     Patient Name: Yadi Rocha  MRN: [id number]  Today's Date: 2/25/2025  Time Calculation  Start Time: 0920  Stop Time: 1005  Time Calculation (min): 45 min       PT Therapeutic Procedures Time Entry  Manual Therapy Time Entry: 30  Therapeutic Exercise Time Entry: 10    INSURANCE:  Visit Number:3  Insurance Type: Payor: ANTHEM / Plan: ANTHEM TRADITIONAL / Product Type: *No Product type* /   CMS Re-Certification Period:2/17/25 to 5/18/25    Current Problem  1. Incomplete tear of left rotator cuff, unspecified whether traumatic  Follow Up In Physical Therapy          PRECAUTIONS:  It is okay for the patient to shower but avoid soaking the wounds (no pools or bathtubs) for 3-4 more weeks until the wounds are completely healed. The patient can slowly increase their activity levels, but must do so slowly to avoid aggravating the joint. We gave the patient a prescription for physical therapy to work on range of motion and strengthening of the extremity. We did discuss with the patient that she will not begin any strengthening exercises until she is 3 months postoperative. She should continue to wear her sling until she is 6 weeks postop.      PMH: (pertinent to PT evaluation)     *See Epic EMR for complete list.    Medical History Form: Reviewed (scanned into chart)    PAIN  Start of Treatment: 0/10 at rest in sling, but strong with any movement      SUBJECTIVE  The pt has 1 more week to wear her sling.  She has questions about expectations of coming out of the sling.  The patient reports she is doing better.  Able to get shirt on underneath the sling with her 's help.  She is performing her home exercise program.  Fingers get pins-and-needles feeling and then at times.      OBJECTIVE  TREATMENT:  Manual Therapy:    Supine PROM to L shoulder, elbow, forearm, and wrist    Therapeutic Exercise:    *Pendulum exercise - DNP    *Standing with UE on table and weight shifting  -  "DNP   Lateral   Forward and back    *Table glide in standing - DNP   Flexion   Circles    *Ball rolling (small) on table   CW, CCW, forward and back    UBE   Rocking     Home exercise program: (HEP)  Issued \"**\" exercise as new HEP with written copy issued.   \"*\" exercises are current HEP    Education/instruction:  New:    Progress  Weaning from sling  Able to remove sling with arm supported, especially if fingers are getting    Prior:  Pt educated in:  + current status, general goals, treatment plan  + use of body on arm for hygiene  + Expectations for therapy  + Benefits of self active assistive and passive range of motion  + Added ball rolling to HEP  Home Exercise program:  See \"*\" exercises above    PAIN  End of session pain ratin/10 IN SLING    ASSESSMENT:  The pt was able to relax more for PROM.  Gentle range of motion achieved increased shoulder abduction, external rotation, and elbow extension.  Flexion continues to be challenging for her.  She was able to use the UBE with passive rocking and achieved increased extension and very mild flexion.    Compliance with HEP:     good  Assessment of current progress against goals:    insufficient evidence      GOALS:  Pt stated goal:  Full use of the left upper extremity     Short term goals:  + Increase left shoulder ROM by 20 degrees  + Reduce pain by 2 points with movement  + Independent in a basic HEP per protocol  + Able to sleep at least 6 hours a night     Long term goals:  + reduce pain to <3/10  + Full AROM in left shoulder as needed for reaching over head  + 5/5 strength in left shoulder and scapular stabilizers for stability during functional reach and lifting  + Improved posture and independent postural correction for reduced pain and proper biomechanics for optimal shoulder function  + Independent in a HEP for self-management of symptoms.  + Reduce soft tissue restrictions for normal biomechanics and function  + Improve function with reduced " reported disability by 10% on ASES  + Able to bathe and dress using the left UE without increased pain  + Able to lift and reach without increased pain or compensation as needed for home and work     PLAN  Consider EMS  Consider rocking on UBE    Skilled physical therapy 2 times per week for 12 weeks  Treatment may include:  Therapeutic Exercise (22419)  Therapeutic Activity (19478)  Manual therapy (28610)  Neuro-muscular re-education (59853)  Ultrasound (47200)  Unattended Electrical Stimulation (/80373)  Attended Electrical Stimulation (62545)  Light therapy (86805)

## 2025-02-27 ENCOUNTER — TREATMENT (OUTPATIENT)
Dept: PHYSICAL THERAPY | Facility: CLINIC | Age: 55
End: 2025-02-27
Payer: COMMERCIAL

## 2025-02-27 DIAGNOSIS — M75.112 INCOMPLETE TEAR OF LEFT ROTATOR CUFF, UNSPECIFIED WHETHER TRAUMATIC: ICD-10-CM

## 2025-02-27 PROCEDURE — 97014 ELECTRIC STIMULATION THERAPY: CPT | Mod: GP | Performed by: PHYSICAL THERAPIST

## 2025-02-27 PROCEDURE — 97140 MANUAL THERAPY 1/> REGIONS: CPT | Mod: GP | Performed by: PHYSICAL THERAPIST

## 2025-02-27 NOTE — PROGRESS NOTES
Physical Therapy Treatment     Patient Name: Yadi Rocha  MRN: [id number]  Today's Date: 2/27/2025  Time Calculation  Start Time: 0917  Stop Time: 1005  Time Calculation (min): 48 min    PT Modalities Time Entry  E-Stim (Unattended) Time Entry: 15  PT Therapeutic Procedures Time Entry  Manual Therapy Time Entry: 28    INSURANCE:  Visit Number: 4  Insurance Type: Payor: ANTHEM / Plan: ANTHEM TRADITIONAL / Product Type: *No Product type* /   CMS Re-Certification Period:2/17/25 to 5/18/25    Current Problem  1. Incomplete tear of left rotator cuff, unspecified whether traumatic  Follow Up In Physical Therapy          PRECAUTIONS:  It is okay for the patient to shower but avoid soaking the wounds (no pools or bathtubs) for 3-4 more weeks until the wounds are completely healed. The patient can slowly increase their activity levels, but must do so slowly to avoid aggravating the joint. We gave the patient a prescription for physical therapy to work on range of motion and strengthening of the extremity. We did discuss with the patient that she will not begin any strengthening exercises until she is 3 months postoperative. She should continue to wear her sling until she is 6 weeks postop.      PMH: (pertinent to PT evaluation)     *See Epic EMR for complete list.    Medical History Form: Reviewed (scanned into chart)    PAIN  Start of Treatment: 4/10 at rest in sling, but 7/10 with any movement      SUBJECTIVE  The pt did a little more at home yesterday and felt good, but this morning has been harder with pain.  She had more difficulty with dressing.       OBJECTIVE  TREATMENT:  Manual Therapy:    Supine PROM to L shoulder, elbow, forearm, and wrist    Therapeutic Exercise:    *Pendulum exercise - DNP    *Standing with UE on table and weight shifting  - DNP   Lateral   Forward and back    *Table glide in standing - DNP   Flexion   Circles    *Ball rolling (small) on table   CW, CCW, forward and  "back    UBE   Rocking     Home exercise program: (HEP)  Issued \"**\" exercise as new HEP with written copy issued.   \"*\" exercises are current HEP    Education/instruction:  New:    Realistic Progression    Prior:  Pt educated in:  + current status, general goals, treatment plan  + use of body on arm for hygiene  + Expectations for therapy  + Benefits of self active assistive and passive range of motion  + Added ball rolling to HEP  +Progress  +Weaning from sling  +Able to remove sling with arm supported, especially if fingers are getting      Home Exercise program:  See \"*\" exercises above    PAIN  End of session pain ratin/10 IN SLING    ASSESSMENT:  The pt was able to relax more for PROM despite increased pain today.  Added EMS to addresss pain and tightness with good response.  .  Gentle range of motion achieved increased shoulder abduction, external rotation, and elbow extension.  Flexion continues to be challenging for her.    Compliance with HEP:     good  Assessment of current progress against goals:    insufficient evidence      GOALS:  Pt stated goal:  Full use of the left upper extremity     Short term goals:  + Increase left shoulder ROM by 20 degrees  + Reduce pain by 2 points with movement  + Independent in a basic HEP per protocol  + Able to sleep at least 6 hours a night     Long term goals:  + reduce pain to <3/10  + Full AROM in left shoulder as needed for reaching over head  + 5/5 strength in left shoulder and scapular stabilizers for stability during functional reach and lifting  + Improved posture and independent postural correction for reduced pain and proper biomechanics for optimal shoulder function  + Independent in a HEP for self-management of symptoms.  + Reduce soft tissue restrictions for normal biomechanics and function  + Improve function with reduced reported disability by 10% on ASES  + Able to bathe and dress using the left UE without increased pain  + Able to lift and reach " without increased pain or compensation as needed for home and work     PLAN  Consider EMS  Consider rocking on UBE    Skilled physical therapy 2 times per week for 12 weeks  Treatment may include:  Therapeutic Exercise (43695)  Therapeutic Activity (03858)  Manual therapy (70176)  Neuro-muscular re-education (82353)  Ultrasound (94179)  Unattended Electrical Stimulation (/41080)  Attended Electrical Stimulation (69861)  Light therapy (22148)

## 2025-03-04 ENCOUNTER — TREATMENT (OUTPATIENT)
Dept: PHYSICAL THERAPY | Facility: CLINIC | Age: 55
End: 2025-03-04
Payer: COMMERCIAL

## 2025-03-04 DIAGNOSIS — M75.112 INCOMPLETE TEAR OF LEFT ROTATOR CUFF, UNSPECIFIED WHETHER TRAUMATIC: ICD-10-CM

## 2025-03-04 PROCEDURE — 97140 MANUAL THERAPY 1/> REGIONS: CPT | Mod: GP | Performed by: PHYSICAL THERAPIST

## 2025-03-04 PROCEDURE — 97110 THERAPEUTIC EXERCISES: CPT | Mod: GP | Performed by: PHYSICAL THERAPIST

## 2025-03-07 ENCOUNTER — TREATMENT (OUTPATIENT)
Dept: PHYSICAL THERAPY | Facility: CLINIC | Age: 55
End: 2025-03-07
Payer: COMMERCIAL

## 2025-03-07 DIAGNOSIS — M75.112 INCOMPLETE TEAR OF LEFT ROTATOR CUFF, UNSPECIFIED WHETHER TRAUMATIC: ICD-10-CM

## 2025-03-07 PROCEDURE — 97140 MANUAL THERAPY 1/> REGIONS: CPT | Mod: GP | Performed by: PHYSICAL THERAPIST

## 2025-03-07 PROCEDURE — 97110 THERAPEUTIC EXERCISES: CPT | Mod: GP | Performed by: PHYSICAL THERAPIST

## 2025-03-07 NOTE — PROGRESS NOTES
Physical Therapy Treatment     Patient Name: Yadi Rocha  MRN: [id number]  Today's Date: 3/7/2025  Time Calculation  Start Time: 0920  Stop Time: 1005  Time Calculation (min): 45 min       PT Therapeutic Procedures Time Entry  Manual Therapy Time Entry: 20  Therapeutic Exercise Time Entry: 25    INSURANCE:  Visit Number: 4  Insurance Type: Payor: AN[employer]KATHLEEN / Plan: ANTHEM TRADITIONAL / Product Type: *No Product type* /   CMS Re-Certification Period:2/17/25 to 5/18/25    Current Problem  1. Incomplete tear of left rotator cuff, unspecified whether traumatic  Follow Up In Physical Therapy          PRECAUTIONS:  It is okay for the patient to shower but avoid soaking the wounds (no pools or bathtubs) for 3-4 more weeks until the wounds are completely healed. The patient can slowly increase their activity levels, but must do so slowly to avoid aggravating the joint. We gave the patient a prescription for physical therapy to work on range of motion and strengthening of the extremity. We did discuss with the patient that she will not begin any strengthening exercises until she is 3 months postoperative. She should continue to wear her sling until she is 6 weeks postop.      PMH: (pertinent to PT evaluation)     *See Epic EMR for complete list.    Medical History Form: Reviewed (scanned into chart)    PAIN  Start of Treatment: 4/10 at rest in sling, but 7/10 with any movement      SUBJECTIVE  The pt been back to work every day this week.  She is very fatigued by the end of the day.        OBJECTIVE  ==========3 - 4 - 25 ================  SHOULDER:           PROM                                                                                             L                       Flexion                          55  degrees                   Abduction                     70  degrees       ER at neutral                 -10  degrees              ====================================     TREATMENT:  Manual Therapy:    Supine  "PROM to L shoulder, and elbow,     Therapeutic Exercise:    *Pendulum exercise - DNP    *Standing with UE on table and weight shifting     Lateral   Forward and back    *Table glide in standing -    Flexion   Circles    *Ball rolling (small) on table   CW, CCW, forward and back    UBE   Rocking to full revolution forward 2 minutes   Full revolution backward 1 minutes    Pulleys   Small ROM  2 minutes     Home exercise program: (HEP)  Issued \"**\" exercise as new HEP with written copy issued.   \"*\" exercises are current HEP    Education/instruction:  New:    progress and nature of progression    Prior:  Pt educated in:  + current status, general goals, treatment plan  + use of body on arm for hygiene  + Expectations for therapy  + Benefits of self active assistive and passive range of motion  + Added ball rolling to HEP  +Progress  +Weaning from sling  +Able to remove sling with arm supported, especially if fingers are getting  +Realistic Progression    Home Exercise program:  See \"*\" exercises above    PAIN  End of session pain ratin/10 IN SLING    ASSESSMENT:  The pt made progress today with the ability to perform full revolution on the UBE.  She has an easier time relaxing during passive range of motion.  Flexion continues to be most challenging for her.  She has improved flexibility and ease of accessing the range of motion she has with passive range and therapy.      Compliance with HEP:     good  Assessment of current progress against goals:    progressing      GOALS:  Pt stated goal:  Full use of the left upper extremity     Short term goals:  + Increase left shoulder ROM by 20 degrees  + Reduce pain by 2 points with movement  + Independent in a basic HEP per protocol  + Able to sleep at least 6 hours a night     Long term goals:  + reduce pain to <3/10  + Full AROM in left shoulder as needed for reaching over head  + 5/5 strength in left shoulder and scapular stabilizers for stability during functional " reach and lifting  + Improved posture and independent postural correction for reduced pain and proper biomechanics for optimal shoulder function  + Independent in a HEP for self-management of symptoms.  + Reduce soft tissue restrictions for normal biomechanics and function  + Improve function with reduced reported disability by 10% on ASES  + Able to bathe and dress using the left UE without increased pain  + Able to lift and reach without increased pain or compensation as needed for home and work     PLAN  Consider EMS  Consider rocking on UBE    Skilled physical therapy 2 times per week for 12 weeks  Treatment may include:  Therapeutic Exercise (10588)  Therapeutic Activity (37436)  Manual therapy (09674)  Neuro-muscular re-education (40452)  Ultrasound (67409)  Unattended Electrical Stimulation (/15930)  Attended Electrical Stimulation (04208)  Light therapy (49647)

## 2025-03-11 ENCOUNTER — TREATMENT (OUTPATIENT)
Dept: PHYSICAL THERAPY | Facility: CLINIC | Age: 55
End: 2025-03-11
Payer: COMMERCIAL

## 2025-03-11 DIAGNOSIS — M75.112 INCOMPLETE TEAR OF LEFT ROTATOR CUFF, UNSPECIFIED WHETHER TRAUMATIC: ICD-10-CM

## 2025-03-11 PROCEDURE — 97110 THERAPEUTIC EXERCISES: CPT | Mod: GP | Performed by: PHYSICAL THERAPIST

## 2025-03-11 PROCEDURE — 97140 MANUAL THERAPY 1/> REGIONS: CPT | Mod: GP | Performed by: PHYSICAL THERAPIST

## 2025-03-11 NOTE — PROGRESS NOTES
Physical Therapy Treatment     Patient Name: Yadi Rocha  MRN: [id number]  Today's Date: 3/11/2025  Time Calculation  Start Time: 0920  Stop Time: 1002  Time Calculation (min): 42 min       PT Therapeutic Procedures Time Entry  Manual Therapy Time Entry: 15  Therapeutic Exercise Time Entry: 25    INSURANCE:  Visit Number:7  Insurance Type: Payor: ANTHEM / Plan: ANTHEM TRADITIONAL / Product Type: *No Product type* /   CMS Re-Certification Period:2/17/25 to 5/18/25    Current Problem  1. Incomplete tear of left rotator cuff, unspecified whether traumatic  Follow Up In Physical Therapy          PRECAUTIONS:  It is okay for the patient to shower but avoid soaking the wounds (no pools or bathtubs) for 3-4 more weeks until the wounds are completely healed. The patient can slowly increase their activity levels, but must do so slowly to avoid aggravating the joint. We gave the patient a prescription for physical therapy to work on range of motion and strengthening of the extremity. We did discuss with the patient that she will not begin any strengthening exercises until she is 3 months postoperative. She should continue to wear her sling until she is 6 weeks postop.      PMH: (pertinent to PT evaluation)     *See Epic EMR for complete list.    Medical History Form: Reviewed (scanned into chart)    PAIN  Start of Treatment: 4/10 at rest in sling, but 7/10 with any movement      SUBJECTIVE  The pt been out of the sling for an hour. She tried to sleep without it and didn't last long.      OBJECTIVE  ==========3 - 11- 25 ================  SHOULDER:           PROM                                                                                             L                       Flexion                          60  degrees                   Abduction                     80  degrees       ER at neutral                 0  degrees              ====================================     TREATMENT:  Manual Therapy:    Supine  "PROM to L shoulder, and elbow,     Therapeutic Exercise:    *Pendulum exercise - DNP    *Standing with UE on table and weight shifting     Lateral   Forward and back    *Table glide in standing - DNP   Flexion   Circles    *Ball rolling (redl) on table   CW, CCW, forward and back    UBE   full revolution forward 2 minutes   Full revolution backward 2 minutes    Pulleys   Small ROM  2 minutes     Home exercise program: (HEP)  Issued \"**\" exercise as new HEP with written copy issued.   \"*\" exercises are current HEP    Education/instruction:  New:    progress and nature of progression    Prior:  Pt educated in:  + current status, general goals, treatment plan  + use of body on arm for hygiene  + Expectations for therapy  + Benefits of self active assistive and passive range of motion  + Added ball rolling to HEP  +Progress  +Weaning from sling  +Able to remove sling with arm supported, especially if fingers are getting  +Realistic Progression    Home Exercise program:  See \"*\" exercises above    PAIN  End of session pain ratin/10 IN SLING    ASSESSMENT:  The pt made progress today with the ability to start with  full revolution on the UBE and greater range on the pulleys.  She has an easier time relaxing during passive range of motion and increased ROM with all motions as above.  Elbow is near full extension. She is having less pain with PROM and getting out of the sling.  .      Compliance with HEP:     good  Assessment of current progress against goals:    progressing      GOALS:  Pt stated goal:  Full use of the left upper extremity     Short term goals:  + Increase left shoulder ROM by 20 degrees  + Reduce pain by 2 points with movement  + Independent in a basic HEP per protocol  + Able to sleep at least 6 hours a night     Long term goals:  + reduce pain to <3/10  + Full AROM in left shoulder as needed for reaching over head  + 5/5 strength in left shoulder and scapular stabilizers for stability during " functional reach and lifting  + Improved posture and independent postural correction for reduced pain and proper biomechanics for optimal shoulder function  + Independent in a HEP for self-management of symptoms.  + Reduce soft tissue restrictions for normal biomechanics and function  + Improve function with reduced reported disability by 10% on ASES  + Able to bathe and dress using the left UE without increased pain  + Able to lift and reach without increased pain or compensation as needed for home and work     PLAN  Consider EMS  Consider rocking on UBE    Skilled physical therapy 2 times per week for 12 weeks  Treatment may include:  Therapeutic Exercise (18121)  Therapeutic Activity (31446)  Manual therapy (39914)  Neuro-muscular re-education (90666)  Ultrasound (41342)  Unattended Electrical Stimulation (/37710)  Attended Electrical Stimulation (21703)  Light therapy (15327)

## 2025-03-14 ENCOUNTER — TREATMENT (OUTPATIENT)
Dept: PHYSICAL THERAPY | Facility: CLINIC | Age: 55
End: 2025-03-14
Payer: COMMERCIAL

## 2025-03-14 DIAGNOSIS — M75.112 INCOMPLETE TEAR OF LEFT ROTATOR CUFF, UNSPECIFIED WHETHER TRAUMATIC: ICD-10-CM

## 2025-03-14 PROCEDURE — 97140 MANUAL THERAPY 1/> REGIONS: CPT | Mod: GP | Performed by: PHYSICAL THERAPIST

## 2025-03-14 PROCEDURE — 97110 THERAPEUTIC EXERCISES: CPT | Mod: GP | Performed by: PHYSICAL THERAPIST

## 2025-03-14 NOTE — PROGRESS NOTES
"Physical Therapy Treatment     Patient Name: Yadi Rocha  MRN: [id number]  Today's Date: 3/14/2025               INSURANCE:  Visit Number:7  Insurance Type: Payor: ADELA / Plan: ADELA TRADITIONAL / Product Type: *No Product type* /   CMS Re-Certification Period:2/17/25 to 5/18/25    Current Problem  1. Incomplete tear of left rotator cuff, unspecified whether traumatic  Follow Up In Physical Therapy          PRECAUTIONS:  It is okay for the patient to shower but avoid soaking the wounds (no pools or bathtubs) for 3-4 more weeks until the wounds are completely healed. The patient can slowly increase their activity levels, but must do so slowly to avoid aggravating the joint. We gave the patient a prescription for physical therapy to work on range of motion and strengthening of the extremity. We did discuss with the patient that she will not begin any strengthening exercises until she is 3 months postoperative. She should continue to wear her sling until she is 6 weeks postop.      PMH: (pertinent to PT evaluation)     *See Epic EMR for complete list.    Medical History Form: Reviewed (scanned into chart)    PAIN  Start of Treatment: 4/10 at rest in sling, but 7/10 with any movement      SUBJECTIVE  The pt is making progress with       OBJECTIVE  ==========3 - 11- 25 ================  SHOULDER:           PROM                                                                                             L                       Flexion                          60  degrees                   Abduction                     80  degrees       ER at neutral                 0  degrees              ====================================     TREATMENT:  Manual Therapy:    Supine PROM to L shoulder, and elbow,     Therapeutic Exercise:    UBE   full revolution forward 2 minutes   Full revolution backward 2 minutes    Pulleys   Small ROM  2 minutes    Supine clasped hands:    *Ceiling punch   *\"Tick-tocks\" lateral and " "up/down     Home exercise program: (HEP)  Issued \"**\" exercise as new HEP with written copy issued.   \"*\" exercises are current HEP including:   *Pendulum exercise - DNP    *Standing with UE on table and weight shifting     Lateral   Forward and back    *Table glide in standing - DNP   Flexion   Circles    *Ball rolling (redl) on table   CW, CCW, forward and back  Education/instruction:  New:    Expected time frame for therapy  Precautions  New HEP  progress and nature of progression    Prior:  Pt educated in:  + current status, general goals, treatment plan  + use of body on arm for hygiene  + Expectations for therapy  + Benefits of self active assistive and passive range of motion  + Added ball rolling to HEP  +Progress  +Weaning from sling  +Able to remove sling with arm supported, especially if fingers are getting  +Realistic Progression      PAIN  End of session pain rating:  mild soreness out of sling    ASSESSMENT:  The pt continues to make progress with reducing use of sling. She is able to start AAROM on the UBE vs prior PROM.  She was also able to progress to AAROM exercise in supine.  ROM increased on pulleys today as well.    Compliance with HEP:     good  Assessment of current progress against goals:    progressing      GOALS:  Pt stated goal:  Full use of the left upper extremity     Short term goals:  + Increase left shoulder ROM by 20 degrees  + Reduce pain by 2 points with movement  + Independent in a basic HEP per protocol  + Able to sleep at least 6 hours a night     Long term goals:  + reduce pain to <3/10  + Full AROM in left shoulder as needed for reaching over head  + 5/5 strength in left shoulder and scapular stabilizers for stability during functional reach and lifting  + Improved posture and independent postural correction for reduced pain and proper biomechanics for optimal shoulder function  + Independent in a HEP for self-management of symptoms.  + Reduce soft tissue restrictions for " normal biomechanics and function  + Improve function with reduced reported disability by 10% on ASES  + Able to bathe and dress using the left UE without increased pain  + Able to lift and reach without increased pain or compensation as needed for home and work     PLAN  Consider EMS  Consider rocking on UBE    Skilled physical therapy 2 times per week for 12 weeks  Treatment may include:  Therapeutic Exercise (16637)  Therapeutic Activity (05294)  Manual therapy (53625)  Neuro-muscular re-education (69170)  Ultrasound (53678)  Unattended Electrical Stimulation (/10301)  Attended Electrical Stimulation (39899)  Light therapy (96674)

## 2025-03-18 ENCOUNTER — TREATMENT (OUTPATIENT)
Dept: PHYSICAL THERAPY | Facility: CLINIC | Age: 55
End: 2025-03-18
Payer: COMMERCIAL

## 2025-03-18 DIAGNOSIS — M75.112 INCOMPLETE TEAR OF LEFT ROTATOR CUFF, UNSPECIFIED WHETHER TRAUMATIC: ICD-10-CM

## 2025-03-18 PROCEDURE — 97140 MANUAL THERAPY 1/> REGIONS: CPT | Mod: GP | Performed by: PHYSICAL THERAPIST

## 2025-03-18 PROCEDURE — 97110 THERAPEUTIC EXERCISES: CPT | Mod: GP | Performed by: PHYSICAL THERAPIST

## 2025-03-18 NOTE — PROGRESS NOTES
Physical Therapy Treatment     Patient Name: Yadi Rocha  MRN: [id number]  Today's Date: 3/18/2025  Time Calculation  Start Time: 0915  Stop Time: 1000  Time Calculation (min): 45 min       PT Therapeutic Procedures Time Entry  Manual Therapy Time Entry: 25  Therapeutic Exercise Time Entry: 20    INSURANCE:  Visit Number:7  Insurance Type: Payor: ANTHEM / Plan: ANTHEM TRADITIONAL / Product Type: *No Product type* /   CMS Re-Certification Period:2/17/25 to 5/18/25    Current Problem  1. Incomplete tear of left rotator cuff, unspecified whether traumatic  Follow Up In Physical Therapy          PRECAUTIONS:  It is okay for the patient to shower but avoid soaking the wounds (no pools or bathtubs) for 3-4 more weeks until the wounds are completely healed. The patient can slowly increase their activity levels, but must do so slowly to avoid aggravating the joint. We gave the patient a prescription for physical therapy to work on range of motion and strengthening of the extremity. We did discuss with the patient that she will not begin any strengthening exercises until she is 3 months postoperative. She should continue to wear her sling until she is 6 weeks postop.      PMH: (pertinent to PT evaluation)     *See Epic EMR for complete list.    Medical History Form: Reviewed (scanned into chart)    PAIN  Start of Treatment: mild      SUBJECTIVE  The pt is making progress with exercises at home as well as dressing.  She had her arm out of the sling for 1.5 hours, doing paperwork, but was sore at that time.        OBJECTIVE  ==========3 - 11- 25 ================  SHOULDER:           PROM                                                                                             L                       Flexion                          60  degrees                   Abduction                     80  degrees       ER at neutral                 0  degrees              ====================================  "    TREATMENT:  Manual Therapy:    Supine PROM to L shoulder, and elbow,     Therapeutic Exercise:    UBE   full revolution forward 2 minutes   Full revolution backward 2 minutes    Pulleys   Small ROM  4 minutes    Supine clasped hands:    *Ceiling punch - held   *\"Tick-tocks\" lateral and up/down- held    Table glides in sitting with slide board   *Flexion x10  to 92 degrees   *Abduction x10 to 95  *ER x10        Home exercise program: (HEP)  Issued \"**\" exercise as new HEP with written copy issued.   \"*\" exercises are current HEP including:   *Pendulum exercise - DNP    *Standing with UE on table and weight shifting     Lateral   Forward and back    *Table glide in standing - DNP   Flexion   Circles    *Ball rolling (redl) on table   CW, CCW, forward and back    Table glides in sitting   *Flexion x10  to 92 degrees   *Abduction x10 to 95  *ER x10    Education/instruction:  New:      New HEP  progress and nature of progression    Prior:  Pt educated in:  + current status, general goals, treatment plan  + use of body on arm for hygiene  + Expectations for therapy  + Benefits of self active assistive and passive range of motion  + Added ball rolling to HEP  +Progress  +Weaning from sling  +Able to remove sling with arm supported, especially if fingers are getting  +Realistic Progression  +Expected time frame for therapy  +Precautions    PAIN  End of session pain rating:  mild soreness     ASSESSMENT:  The pt continues to make progress with notable increase in ROM with exercise.  Supine clasped hand exercises were held due to increased soreness and replaced with table gliding with good tolerance.    Compliance with HEP:     good  Assessment of current progress against goals:    progressing      GOALS:  Pt stated goal:  Full use of the left upper extremity     Short term goals:  + Increase left shoulder ROM by 20 degrees  + Reduce pain by 2 points with movement  + Independent in a basic HEP per protocol  + Able to " sleep at least 6 hours a night     Long term goals:  + reduce pain to <3/10  + Full AROM in left shoulder as needed for reaching over head  + 5/5 strength in left shoulder and scapular stabilizers for stability during functional reach and lifting  + Improved posture and independent postural correction for reduced pain and proper biomechanics for optimal shoulder function  + Independent in a [facility] for self-management of symptoms.  + Reduce soft tissue restrictions for normal biomechanics and function  + Improve function with reduced reported disability by 10% on ASES  + Able to bathe and dress using the left UE without increased pain  + Able to lift and reach without increased pain or compensation as needed for home and work     PLAN  Consider EMS  Consider rocking on UBE    Skilled physical therapy 2 times per week for 12 weeks  Treatment may include:  Therapeutic Exercise (05722)  Therapeutic Activity (44013)  Manual therapy (34179)  Neuro-muscular re-education (19915)  Ultrasound (65106)  Unattended Electrical Stimulation (/50781)  Attended Electrical Stimulation (17155)  Light therapy (91894)

## 2025-03-20 ENCOUNTER — TREATMENT (OUTPATIENT)
Dept: PHYSICAL THERAPY | Facility: CLINIC | Age: 55
End: 2025-03-20
Payer: COMMERCIAL

## 2025-03-20 ENCOUNTER — APPOINTMENT (OUTPATIENT)
Dept: PRIMARY CARE | Facility: CLINIC | Age: 55
End: 2025-03-20
Payer: COMMERCIAL

## 2025-03-20 DIAGNOSIS — M75.112 INCOMPLETE TEAR OF LEFT ROTATOR CUFF, UNSPECIFIED WHETHER TRAUMATIC: ICD-10-CM

## 2025-03-20 PROCEDURE — 97140 MANUAL THERAPY 1/> REGIONS: CPT | Mod: GP,CQ

## 2025-03-20 PROCEDURE — 97110 THERAPEUTIC EXERCISES: CPT | Mod: GP,CQ

## 2025-03-20 PROCEDURE — 97014 ELECTRIC STIMULATION THERAPY: CPT | Mod: GP,CQ

## 2025-03-20 NOTE — PROGRESS NOTES
Physical Therapy Treatment     Patient Name: Yadi Rocha  MRN: [id number]  Today's Date: 3/20/2025  Time Calculation  Start Time: 0845  Stop Time: 0925  Time Calculation (min): 40 min    PT Modalities Time Entry  E-Stim (Unattended) Time Entry: 15  PT Therapeutic Procedures Time Entry  Manual Therapy Time Entry: 10  Therapeutic Exercise Time Entry: 15    INSURANCE:  Visit Number:7  Insurance Type: Payor: ANTHEM / Plan: ANTHEM TRADITIONAL / Product Type: *No Product type* /   CMS Re-Certification Period:2/17/25 to 5/18/25    Current Problem  1. Incomplete tear of left rotator cuff, unspecified whether traumatic  Follow Up In Physical Therapy          PRECAUTIONS:  It is okay for the patient to shower but avoid soaking the wounds (no pools or bathtubs) for 3-4 more weeks until the wounds are completely healed. The patient can slowly increase their activity levels, but must do so slowly to avoid aggravating the joint. We gave the patient a prescription for physical therapy to work on range of motion and strengthening of the extremity. We did discuss with the patient that she will not begin any strengthening exercises until she is 3 months postoperative. She should continue to wear her sling until she is 6 weeks postop.      PMH: (pertinent to PT evaluation)     *See Epic EMR for complete list.    Medical History Form: Reviewed (scanned into chart)    PAIN  Start of Treatment: mild      SUBJECTIVE:Discomfort persist anterior L shld and top of shld      The pt is making progress with exercises at home as well as dressing.  She had her arm out of the sling for 1.5 hours, doing paperwork, but was sore at that time.        OBJECTIVE  ==========3 - 11- 25 ================  SHOULDER:           PROM                                                                                             L                       Flexion                          60  degrees                   Abduction                     80  degrees     "   ER at neutral                 0  degrees              ====================================     TREATMENT:  Manual Therapy:    Supine PROM to L shoulder, and elbow,     Therapeutic Exercise:    UBE   full revolution forward 2 minutes   Full revolution backward 2 minutes    Pulleys   Small ROM  4 minutes    Supine clasped hands:    *Ceiling punch - held   *\"Tick-tocks\" lateral and up/down- held                  Small circles CW-CCW     Table glides in sitting with slide board   *Flexion x10  to 92 degrees   *Abduction x10 to 95  *ER x10        Home exercise program: (HEP)  Issued \"**\" exercise as new HEP with written copy issued.   \"*\" exercises are current HEP including:   *Pendulum exercise - DNP    *Standing with UE on table and weight shifting     Lateral   Forward and back    *Table glide in standing    Flexion   Circles    *Ball rolling (redl) on table   CW, CCW, forward and back    Table glides in sitting   *Flexion x10  to 92 degrees   *Abduction x10 to 95  *ER x10    Education/instruction:  New:      New HEP  progress and nature of progression    Prior:  Pt educated in:  + current status, general goals, treatment plan  + use of body on arm for hygiene  + Expectations for therapy  + Benefits of self active assistive and passive range of motion  + Added ball rolling to HEP  +Progress  +Weaning from sling  +Able to remove sling with arm supported, especially if fingers are getting  +Realistic Progression  +Expected time frame for therapy  +Precautions    PAIN  End of session pain rating:  mild soreness     ASSESSMENT: Performed all act well  , cont with HEP  as issued by PT        PRIOR:  The pt continues to make progress with notable increase in ROM with exercise.  Supine clasped hand exercises were held due to increased soreness and replaced with table gliding with good tolerance.    Compliance with HEP:     good  Assessment of current progress against goals:    progressing      GOALS:  Pt stated " goal:  Full use of the left upper extremity     Short term goals:  + Increase left shoulder ROM by 20 degrees  + Reduce pain by 2 points with movement  + Independent in a basic HEP per protocol  + Able to sleep at least 6 hours a night     Long term goals:  + reduce pain to <3/10  + Full AROM in left shoulder as needed for reaching over head  + 5/5 strength in left shoulder and scapular stabilizers for stability during functional reach and lifting  + Improved posture and independent postural correction for reduced pain and proper biomechanics for optimal shoulder function  + Independent in a HEP for self-management of symptoms.  + Reduce soft tissue restrictions for normal biomechanics and function  + Improve function with reduced reported disability by 10% on ASES  + Able to bathe and dress using the left UE without increased pain  + Able to lift and reach without increased pain or compensation as needed for home and work     PLAN  Consider EMS  Consider rocking on UBE    Skilled physical therapy 2 times per week for 12 weeks  Treatment may include:  Therapeutic Exercise (15960)  Therapeutic Activity (31479)  Manual therapy (65737)  Neuro-muscular re-education (88007)  Ultrasound (34668)  Unattended Electrical Stimulation (/97118)  Attended Electrical Stimulation (05530)  Light therapy (88181)

## 2025-03-25 ENCOUNTER — TREATMENT (OUTPATIENT)
Dept: PHYSICAL THERAPY | Facility: CLINIC | Age: 55
End: 2025-03-25
Payer: COMMERCIAL

## 2025-03-25 DIAGNOSIS — M75.112 INCOMPLETE TEAR OF LEFT ROTATOR CUFF, UNSPECIFIED WHETHER TRAUMATIC: ICD-10-CM

## 2025-03-25 PROCEDURE — 97110 THERAPEUTIC EXERCISES: CPT | Mod: GP | Performed by: PHYSICAL THERAPIST

## 2025-03-25 PROCEDURE — 97014 ELECTRIC STIMULATION THERAPY: CPT | Mod: GP | Performed by: PHYSICAL THERAPIST

## 2025-03-25 PROCEDURE — 97140 MANUAL THERAPY 1/> REGIONS: CPT | Mod: GP | Performed by: PHYSICAL THERAPIST

## 2025-03-25 NOTE — PROGRESS NOTES
Physical Therapy Treatment and Re-assessment    Patient Name: Yadi Rocha  MRN: [id number]  Today's Date: 3/25/2025  Time Calculation  Start Time: 0920  Stop Time: 1010  Time Calculation (min): 50 min    PT Modalities Time Entry  E-Stim (Unattended) Time Entry: 15  PT Therapeutic Procedures Time Entry  Manual Therapy Time Entry: 15  Therapeutic Exercise Time Entry: 20    INSURANCE:  Visit Number:9  Insurance Type: Payor: ADELA / Plan: ANTHEM TRADITIONAL / Product Type: *No Product type* /   CMS Re-Certification Period:2/17/25 to 5/18/25    Current Problem  1. Incomplete tear of left rotator cuff, unspecified whether traumatic  Follow Up In Physical Therapy          PRECAUTIONS:  It is okay for the patient to shower but avoid soaking the wounds (no pools or bathtubs) for 3-4 more weeks until the wounds are completely healed. The patient can slowly increase their activity levels, but must do so slowly to avoid aggravating the joint. We gave the patient a prescription for physical therapy to work on range of motion and strengthening of the extremity. We did discuss with the patient that she will not begin any strengthening exercises until she is 3 months postoperative. She should continue to wear her sling until she is 6 weeks postop.      PMH: (pertinent to PT evaluation)     *See Epic EMR for complete list.    Medical History Form: Reviewed (scanned into chart)    PAIN:    Current  0 /10 at rest  RANGE: 0 /10  to  8/10  Location: L shoulder (anterior) - pulling and tearing   L posterior arm - burning  Aggravating: movement, dressing, dropping the arm back too far.    Reducing:         SUBJECTIVE:  The pt recognizes that she is doing more, but frustrated with her limitations and increased pain as discomfort persist anterior L shld and behind the arm   Able to dress, but with pain and modifications   Fingers are less numb (except thumb)    Out of the sling for a week now.  Sleeps on R side with pillows but no  "sling.    No longer taking meds.  Able to tie her shoe for first time today.    OBJECTIVE  POSTURE:  Patient is currently not using the sling, but supports her arm with the other UE at times  Protraction of L shoulder  Depression of L shoulder     SHOULDER:           PROM                                                                                             L                       Flexion                          100  degrees                   Extension                      0 degrees          Abduction                     60  degrees       ER at neutral                 10  degrees                             IR at neutral                 to body                    SHOULDER  MMT:       Able to perform AAROM to ~70 degrees     ELBOW   ROM:         L           Flexion                         WFL        Extension                      -15                          ELBOW   MMT:         L        Flexion                           4/5                  Extension                       4/5                                 PALPATION:   Tightness and tenderness noted in L UT, L cervical muscles including UT. And throughout the L shoulder including scapular muscles     Skin:  Incision is clean and dry      TREATMENT:  Modalities:   Interferential electric stimulation (IFC) performed to L shoulder   at  Hz with sweep for 15 minutes to strong sensation with heat- at end of session due to increased pain    Manual Therapy:    Supine PROM to L shoulder, and elbow,     Therapeutic Exercise:    UBE   full revolution forward 3 minutes   Full revolution backward 2 minutes    Pulleys   Small ROM  4 minutes ( to 97 degrees)    Table glides in sitting with slide board   *Flexion x30  to 100 degrees   *Abduction x10 to 95  *ER x10    =================DNP=====================  Supine clasped hands:    *Ceiling punch   *\"Tick-tocks\" lateral and up/down                  Small circles CW-CCW " "   ===============================================   Home exercise program: (HEP)  Issued \"**\" exercise as new HEP with written copy issued.   \"*\" exercises are current HEP including:   *Pendulum exercise - DNP    *Standing with UE on table and weight shifting     Lateral   Forward and back    *Table glide in standing    Flexion   Circles    *Ball rolling (redl) on table   CW, CCW, forward and back    Table glides in sitting   *Flexion x10  to 92 degrees   *Abduction x10 to 95  *ER x10    Education/instruction:  New:    +progress and nature of progression    Prior:  Pt educated in:  + current status, general goals, treatment plan  + use of body on arm for hygiene  + Expectations for therapy  + Benefits of self active assistive and passive range of motion  + Added ball rolling to HEP  +Progress  +Weaning from sling  +Able to remove sling with arm supported, especially if fingers are getting  +Realistic Progression  +Expected time frame for therapy  +Precautions  +New HEP    PAIN  End of session pain rating:  Sore, but the EMS did help reduce it some     ASSESSMENT:    The pt continues to make progress with notable increase in ROM with exercise both passive and active-assist.  She is performing more functional activities, but with this is experiencing more pain.  She has good understanding of this connection.      Compliance with HEP:     good  Assessment of current progress against goals:    progressing      GOALS:  Pt stated goal:  Full use of the left upper extremity     Short term goals:  + Increase left shoulder ROM by 20 degrees (MET)  + Reduce pain by 2 points with movement (MET)  + Independent in a basic HEP per protocol (MET)  + Able to sleep at least 6 hours a night (sleeping without sling)     Long term goals:  + reduce pain to <3/10 (PROGRESSING)  + Full AROM in left shoulder as needed for reaching over head  + 5/5 strength in left shoulder and scapular stabilizers for stability during functional reach and " lifting  + Improved posture and independent postural correction for reduced pain and proper biomechanics for optimal shoulder function(PROGRESSING)  + Independent in a HEP for self-management of symptoms.(PROGRESSING)  + Reduce soft tissue restrictions for normal biomechanics and function(PROGRESSING)  + Improve function with reduced reported disability by 10% on ASES  + Able to bathe and dress using the left UE without increased pain(PROGRESSING)  + Able to lift and reach without increased pain or compensation as needed for home and work     PLAN  Skilled physical therapy 2 times per week for 12 weeks  Treatment may include:  Therapeutic Exercise (87074)  Therapeutic Activity (16135)  Manual therapy (13275)  Neuro-muscular re-education (72595)  Ultrasound (90329)  Unattended Electrical Stimulation (/07502)  Attended Electrical Stimulation (41584)  Light therapy (29920)

## 2025-03-28 ENCOUNTER — TREATMENT (OUTPATIENT)
Dept: PHYSICAL THERAPY | Facility: CLINIC | Age: 55
End: 2025-03-28
Payer: COMMERCIAL

## 2025-03-28 DIAGNOSIS — M75.112 INCOMPLETE TEAR OF LEFT ROTATOR CUFF, UNSPECIFIED WHETHER TRAUMATIC: ICD-10-CM

## 2025-03-28 PROCEDURE — 97110 THERAPEUTIC EXERCISES: CPT | Mod: GP | Performed by: PHYSICAL THERAPIST

## 2025-03-28 PROCEDURE — 97014 ELECTRIC STIMULATION THERAPY: CPT | Mod: GP | Performed by: PHYSICAL THERAPIST

## 2025-03-28 PROCEDURE — 97140 MANUAL THERAPY 1/> REGIONS: CPT | Mod: GP | Performed by: PHYSICAL THERAPIST

## 2025-03-28 NOTE — PROGRESS NOTES
Physical Therapy Treatment     Patient Name: Yadi Rocha  MRN: [id number]  Today's Date: 3/28/2025  Time Calculation  Start Time: 1000  Stop Time: 1050  Time Calculation (min): 50 min    PT Modalities Time Entry  E-Stim (Unattended) Time Entry: 15  PT Therapeutic Procedures Time Entry  Manual Therapy Time Entry: 20  Therapeutic Exercise Time Entry: 15    INSURANCE:  Visit Number:10  RE-assessment on visit:  9  Insurance Type: Payor: ADELA / Plan: ANTHEM TRADITIONAL / Product Type: *No Product type* /   CMS Re-Certification Period:2/17/25 to 5/18/25    Current Problem  1. Incomplete tear of left rotator cuff, unspecified whether traumatic  Follow Up In Physical Therapy          PRECAUTIONS:  It is okay for the patient to shower but avoid soaking the wounds (no pools or bathtubs) for 3-4 more weeks until the wounds are completely healed. The patient can slowly increase their activity levels, but must do so slowly to avoid aggravating the joint. We gave the patient a prescription for physical therapy to work on range of motion and strengthening of the extremity. We did discuss with the patient that she will not begin any strengthening exercises until she is 3 months postoperative. She should continue to wear her sling until she is 6 weeks postop.      PMH: (pertinent to PT evaluation)     *See Epic EMR for complete list.    Medical History Form: Reviewed (scanned into chart)    PAIN:    Current 4/10          SUBJECTIVE:  The pt has more pain today, but is doing more and thinks it is related.  The pain is across the side of the shoulder.  She iced before bed and woke up feeling better.  She is busy at work and doesn't;t have as much time to ice or heat.      OBJECTIVE  ===========PRIOR===========================  SHOULDER:           PROM                                                                                             L                       Flexion                          100  degrees                 "   Extension                      0 degrees          Abduction                     60  degrees       ER at neutral                 10  degrees                             IR at neutral                 to body              ================================================                        PALPATION:   Tightness and tenderness noted in L UT, L cervical muscles including UT. And throughout the L shoulder including scapular muscles     Skin:  Incision is clean and dry      TREATMENT:  Modalities:   Interferential electric stimulation (IFC) performed to L shoulder   at  Hz with sweep for 15 minutes to strong sensation with heat- at end of session due to increased pain with moist heat.    Manual Therapy:    sitting PROM to L shoulder ER, and elbow flexion and extension, forearm pronation and supination and wrist flexion and extension  STM to entire L shoulder including UE, deltoid scapular muscles,  and biceps  PROM Scapular gliding,     Therapeutic Exercise:    UBE   full revolution forward 3 minutes   Full revolution backward 2 minutes    Pulleys   Small ROM  4 minutes ( to 97 degrees)    *Ball rolling (redl) on table   CW, CCW, forward and back    =================DNP=====================  Table glides in sitting with slide board   *Flexion x30  to 100 degrees   *Abduction x10 to 95  *ER x10    Supine clasped hands:    *Ceiling punch   *\"Tick-tocks\" lateral and up/down                  Small circles CW-CCW    ===============================================   Home exercise program: (HEP)  Issued \"**\" exercise as new HEP with written copy issued.   \"*\" exercises are current HEP including:   *Pendulum exercise - DNP    *Standing with UE on table and weight shifting     Lateral   Forward and back    *Table glide in standing    Flexion   Circles    *Ball rolling (redl) on table   CW, CCW, forward and back    Table glides in sitting   *Flexion x10  to 92 degrees   *Abduction x10 to 95  *ER " x10    Education/instruction:  New:    +use the sling as needed to reduced pain and offer support.    +may use heat or ice    Prior:  Pt educated in:  + current status, general goals, treatment plan  + use of body on arm for hygiene  + Expectations for therapy  + Benefits of self active assistive and passive range of motion  + Added ball rolling to HEP  +Progress  +Weaning from sling  +Able to remove sling with arm supported, especially if fingers are getting  +Realistic Progression  +Expected time frame for therapy  +Precautions  +New HEP  +progress and nature of progression    PAIN  End of session pain rating:  Sore, but the EMS did help reduce it some     ASSESSMENT:    The pt was more sore today but able to perform gentle mobility exercises without greater difficulty.  Musculature around the entire shoulder is tight and guarded.  Treatment today focused on reducing muscle tension and reducing pain.  Patient may need to use the sling periodically for support and rest of the muscles.  Compliance with HEP:     good  Assessment of current progress against goals:    progressing      GOALS:  Pt stated goal:  Full use of the left upper extremity     Short term goals:  + Increase left shoulder ROM by 20 degrees (MET)  + Reduce pain by 2 points with movement (MET)  + Independent in a basic HEP per protocol (MET)  + Able to sleep at least 6 hours a night (sleeping without sling)     Long term goals:  + reduce pain to <3/10 (PROGRESSING)  + Full AROM in left shoulder as needed for reaching over head  + 5/5 strength in left shoulder and scapular stabilizers for stability during functional reach and lifting  + Improved posture and independent postural correction for reduced pain and proper biomechanics for optimal shoulder function(PROGRESSING)  + Independent in a HEP for self-management of symptoms.(PROGRESSING)  + Reduce soft tissue restrictions for normal biomechanics and function(PROGRESSING)  + Improve function with  reduced reported disability by 10% on ASES  + Able to bathe and dress using the left UE without increased pain(PROGRESSING)  + Able to lift and reach without increased pain or compensation as needed for home and work     PLAN  resume prior exercises    Skilled physical therapy 2 times per week for 12 weeks  Treatment may include:  Therapeutic Exercise (83376)  Therapeutic Activity (06839)  Manual therapy (16220)  Neuro-muscular re-education (03559)  Ultrasound (45479)  Unattended Electrical Stimulation (/92607)  Attended Electrical Stimulation (56974)  Light therapy (28172)

## 2025-04-01 ENCOUNTER — TREATMENT (OUTPATIENT)
Dept: PHYSICAL THERAPY | Facility: CLINIC | Age: 55
End: 2025-04-01
Payer: COMMERCIAL

## 2025-04-01 DIAGNOSIS — M75.112 INCOMPLETE TEAR OF LEFT ROTATOR CUFF, UNSPECIFIED WHETHER TRAUMATIC: ICD-10-CM

## 2025-04-01 PROCEDURE — 97140 MANUAL THERAPY 1/> REGIONS: CPT | Mod: GP | Performed by: PHYSICAL THERAPIST

## 2025-04-01 PROCEDURE — 97110 THERAPEUTIC EXERCISES: CPT | Mod: GP | Performed by: PHYSICAL THERAPIST

## 2025-04-01 NOTE — PROGRESS NOTES
Physical Therapy Treatment     Patient Name: Yadi Rocha  MRN: [id number]  Today's Date: 4/1/2025  Time Calculation  Start Time: 0835  Stop Time: 0920  Time Calculation (min): 45 min       PT Therapeutic Procedures Time Entry  Manual Therapy Time Entry: 20  Therapeutic Exercise Time Entry: 25    INSURANCE:  Visit Number:10  RE-assessment on visit:  9  Insurance Type: Payor: ANTHEM / Plan: ANTHEM TRADITIONAL / Product Type: *No Product type* /   CMS Re-Certification Period:2/17/25 to 5/18/25    Current Problem  1. Incomplete tear of left rotator cuff, unspecified whether traumatic  Follow Up In Physical Therapy          PRECAUTIONS:  It is okay for the patient to shower but avoid soaking the wounds (no pools or bathtubs) for 3-4 more weeks until the wounds are completely healed. The patient can slowly increase their activity levels, but must do so slowly to avoid aggravating the joint. We gave the patient a prescription for physical therapy to work on range of motion and strengthening of the extremity. We did discuss with the patient that she will not begin any strengthening exercises until she is 3 months postoperative. She should continue to wear her sling until she is 6 weeks postop.      PMH: (pertinent to PT evaluation)     *See Epic EMR for complete list.    Medical History Form: Reviewed (scanned into chart)    PAIN:    Current 4/10          SUBJECTIVE:  The pt has achy pain all the time, like she had a new work out.  She heats in the morning and ices at night which helps.        OBJECTIVE  ===========4/1/25===========================  SHOULDER:           PROM                                                                                             L                       Flexion                          115  degrees                   Extension                      0 degrees          Abduction                     90  degrees       ER at neutral                 25  degrees                            "  IR at neutral                 to body              ================================================                        PALPATION:   Tightness and tenderness noted in L UT, L cervical muscles including UT. And throughout the L shoulder including scapular muscles     Skin:  Incision is clean and dry  Tender with mild edema over incission      TREATMENT:  Manual Therapy:    supine PROM to L shoulder ER, abduction, and flexion  STM to entire L shoulder including UE, deltoid scapular muscles,  and B cervical/UT  PROM Scapular gliding,     Therapeutic Exercise:    UBE   forward 3 minutes   backward 2 minutes    Pulleys   flexion  4 minutes     *Ball rolling (redl) on table   CW, CCW, forward and back    Supine clasped hands:    *Ceiling punch   *\"Tick-tocks\" lateral and up/down                  Small circles CW-CCW      Home exercise program: (HEP)  Issued \"**\" exercise as new HEP with written copy issued.   \"*\" exercises are current HEP including:   *Pendulum exercise - DNP    *Standing with UE on table and weight shifting     Lateral   Forward and back    *Table glide in standing    Flexion   Circles    *Ball rolling (redl) on table   CW, CCW, forward and back    Table glides in sitting   *Flexion x10  to 92 degrees   *Abduction x10 to 95  *ER x10    Education/instruction:  New:    + progress  + edema control    Prior:  Pt educated in:  + current status, general goals, treatment plan  + use of body on arm for hygiene  + Expectations for therapy  + Benefits of self active assistive and passive range of motion  + Added ball rolling to HEP  +Progress  +Weaning from sling  +Able to remove sling with arm supported, especially if fingers are getting  +Realistic Progression  +Expected time frame for therapy  +Precautions  +New HEP  +progress and nature of progression  +use the sling as needed to reduced pain and offer support.    +may use heat or ice    PAIN  End of session pain rating:  reduced after " manual    ASSESSMENT:    The pt was less sore overall today, but has a constant ache in the shoulder.  She recognizes that she is using the arm a lot more throughout the day and doesn't have as much time to ice as before.  She has improved ROM in all motions by at least 10 degrees since re-assessment just 2 sessions ago, with abduction improved by 30 degrees.     Patient may need to use the sling or rest the arm on a pillow or table periodically for support and rest of the muscles.    Compliance with HEP:     good  Assessment of current progress against goals:    progressing      GOALS:  Pt stated goal:  Full use of the left upper extremity     Short term goals:  + Increase left shoulder ROM by 20 degrees (MET)  + Reduce pain by 2 points with movement (MET)  + Independent in a basic HEP per protocol (MET)  + Able to sleep at least 6 hours a night (sleeping without sling)     Long term goals:  + reduce pain to <3/10 (PROGRESSING)  + Full AROM in left shoulder as needed for reaching over head  + 5/5 strength in left shoulder and scapular stabilizers for stability during functional reach and lifting  + Improved posture and independent postural correction for reduced pain and proper biomechanics for optimal shoulder function(PROGRESSING)  + Independent in a HEP for self-management of symptoms.(PROGRESSING)  + Reduce soft tissue restrictions for normal biomechanics and function(PROGRESSING)  + Improve function with reduced reported disability by 10% on ASES  + Able to bathe and dress using the left UE without increased pain(PROGRESSING)  + Able to lift and reach without increased pain or compensation as needed for home and work     PLAN  Progress exercise per protocol as tolerated    Skilled physical therapy 2 times per week for 10 weeks  Treatment may include:  Therapeutic Exercise (62918)  Therapeutic Activity (30500)  Manual therapy (40241)  Neuro-muscular re-education (01980)  Ultrasound (04332)  Unattended  Electrical Stimulation (/72366)  Attended Electrical Stimulation (24738)  Light therapy (25041)

## 2025-04-02 ENCOUNTER — APPOINTMENT (OUTPATIENT)
Dept: ORTHOPEDIC SURGERY | Facility: CLINIC | Age: 55
End: 2025-04-02
Payer: COMMERCIAL

## 2025-04-02 VITALS — BODY MASS INDEX: 31.98 KG/M2 | HEIGHT: 68 IN | WEIGHT: 211 LBS

## 2025-04-02 DIAGNOSIS — Z98.890 STATUS POST LEFT ROTATOR CUFF REPAIR: Primary | ICD-10-CM

## 2025-04-02 PROCEDURE — 3008F BODY MASS INDEX DOCD: CPT

## 2025-04-02 PROCEDURE — 1036F TOBACCO NON-USER: CPT

## 2025-04-02 PROCEDURE — 99024 POSTOP FOLLOW-UP VISIT: CPT

## 2025-04-02 ASSESSMENT — PAIN - FUNCTIONAL ASSESSMENT: PAIN_FUNCTIONAL_ASSESSMENT: 0-10

## 2025-04-02 ASSESSMENT — PAIN SCALES - GENERAL: PAINLEVEL_OUTOF10: 6

## 2025-04-02 NOTE — PROGRESS NOTES
History of Present Illness       Chief Complaint   Patient presents with    Left Shoulder - Post-op       1/23/25 LT SHOULDER SCOPE, RTC REPAIR, BICEPS TENODESIS         54 y.o. female is 10 weeks status post left shoulder scope with rotator cuff repair and bicep tenodesis.  Patient states that her pain is tolerable at this point.  Patient is not taking anything for pain at this point.  She states that she ice at night.  She notes that physical therapy is going well.  She is seeing some progress with her range of motion.  She still notes that she does have some discomfort and it feels sore at times.  It has improved since surgery.  No radicular symptoms that she reports.  She has no concerns with her incisions today.    Review of Systems   GENERAL: Negative for malaise, significant weight loss, fever, chills  MUSCULOSKELETAL: see HPI  NEURO:  Negative     Exam  Left shoulder demonstrates well-healed portal site incisions.  There is no tenderness palpation over the sternoclavicular joint or AC joint today.  Range of motion of the left shoulder is 110 degrees forward flexion abduction, 40 degrees of external rotation and internally rotates to the greater trochanter.   strength is symmetric bilaterally failure radial pulses 2+ bilaterally.  SILT. UE is NVI.     Assessment  Patient is 10 weeks status post left shoulder scope with rotator cuff repair, biceps tenodesis.  Patient is doing satisfactory from postoperative standpoint at this point     Plan   discussed further management with patient today.  At this time we discussed with her that she should continue physical therapy.  She was shown exercises that she can do on her own in addition to physical therapy to improve her range of motion.  Additionally we discussed that she can continue to take Tylenol or anti-inflammatories as needed for pain.  She should continue to ice as needed.  She can slowly increase activities as tolerable.  Patient can begin  strengthening exercises when she is 12 weeks postoperative.  Will plan on following up with the patient in 8 weeks for a recheck of the left shoulder.  If at that time she continues to satisfactory we may plan on releasing her regarding this issue.

## 2025-04-02 NOTE — PATIENT INSTRUCTIONS
BMI was above normal measurement. Current weight: 95.7 kg (211 lb)  Weight change since last visit (-) denotes wt loss -14 lbs   Weight loss needed to achieve BMI 25: 46.9 Lbs  Weight loss needed to achieve BMI 30: 14.1 Lbs  Advised to Increase physical activity.

## 2025-04-04 ENCOUNTER — TREATMENT (OUTPATIENT)
Dept: PHYSICAL THERAPY | Facility: CLINIC | Age: 55
End: 2025-04-04
Payer: COMMERCIAL

## 2025-04-04 DIAGNOSIS — M75.112 INCOMPLETE TEAR OF LEFT ROTATOR CUFF, UNSPECIFIED WHETHER TRAUMATIC: ICD-10-CM

## 2025-04-04 PROCEDURE — 97110 THERAPEUTIC EXERCISES: CPT | Mod: GP | Performed by: PHYSICAL THERAPIST

## 2025-04-04 PROCEDURE — 97140 MANUAL THERAPY 1/> REGIONS: CPT | Mod: GP | Performed by: PHYSICAL THERAPIST

## 2025-04-04 NOTE — PROGRESS NOTES
Physical Therapy Treatment     Patient Name: Yadi Rocha  MRN: [id number]  Today's Date: 4/4/2025  Time Calculation  Start Time: 1000  Stop Time: 1045  Time Calculation (min): 45 min       PT Therapeutic Procedures Time Entry  Manual Therapy Time Entry: 20  Therapeutic Exercise Time Entry: 25    INSURANCE:  Visit Number:10  RE-assessment on visit:  9  Insurance Type: Payor: ANTHEM / Plan: ANTHEM TRADITIONAL / Product Type: *No Product type* /   CMS Re-Certification Period:2/17/25 to 5/18/25    Current Problem  1. Incomplete tear of left rotator cuff, unspecified whether traumatic  Follow Up In Physical Therapy          PRECAUTIONS:  work on range of motion  of the extremity. She will not begin any strengthening exercises until she is 3 months postoperative.      PMH: (pertinent to PT evaluation)     *See Epic EMR for complete list.    Medical History Form: Reviewed (scanned into chart)    PAIN:    Current 4/10          SUBJECTIVE:  The pt saw the doctor and was told she is on track. She was given exercises for ER, IR, and wall climbs to increase motion. No strengthening in 6 weeks.      OBJECTIVE  ===========4/1/25===========================  SHOULDER:           PROM                                                                                             L                       Flexion                          115  degrees                   Extension                      0 degrees          Abduction                     90  degrees       ER at neutral                 25  degrees                             IR at neutral                 to body              ================================================                        PALPATION:   Tightness and tenderness noted in L UT, L cervical muscles including UT. And throughout the L shoulder including scapular muscles     Skin:  Incision is clean and dry  Tender with mild edema over incission      TREATMENT:  Manual Therapy:    supine PROM to L shoulder ER,  "abduction, and flexion - light  STM to L anterior shoulder in supine  STM to entire L shoulder in sitting including UE, deltoid scapular muscles,  and B cervical/UT  PROM Scapular gliding,     Therapeutic Exercise:    UBE   forward 2 minutes   backward 2 minutes    Pulleys   flexion  3 minutes     Finger ladder   Flexion  to 27  x5    Shouder ER/horizontal abduction stretch on wall  Shoulder IR stretch at counter  ====================DNP========================  *Ball rolling (redl) on table   CW, CCW, forward and back    Supine clasped hands:    *Ceiling punch   *\"Tick-tocks\" lateral and up/down                  Small circles CW-CCW    =================================================  Home exercise program: (HEP)  Issued \"**\" exercise as new HEP with written copy issued.   \"*\" exercises are current HEP including:   *Pendulum exercise - DNP    *Standing with UE on table and weight shifting     Lateral   Forward and back    *Table glide in standing    Flexion   Circles    *Ball rolling (redl) on table   CW, CCW, forward and back    Table glides in sitting   *Flexion x10  to 92 degrees   *Abduction x10 to 95  *ER x10    Shoulder IR stretch at counter    Education/instruction:  New:    Discussed new exercise/stretches per doctor    Prior:  Pt educated in:  + current status, general goals, treatment plan  + use of body on arm for hygiene  + Expectations for therapy  + Benefits of self active assistive and passive range of motion  + Added ball rolling to HEP  +Progress  +Weaning from sling  +Able to remove sling with arm supported, especially if fingers are getting  +Realistic Progression  +Expected time frame for therapy  +Precautions  +New HEP  +progress and nature of progression  +use the sling as needed to reduced pain and offer support.    +may use heat or ice  + progress  + edema control    PAIN  End of session pain rating:  reduced after manual    ASSESSMENT:    The pt was sore after new stretches today.  The " rest of treatment was spent on manual to reduce muscle spasm following increase strain from new exercises.  Will continue to progress gently as patient tolerates.    Compliance with HEP:     good  Assessment of current progress against goals:    progressing      GOALS:  Pt stated goal:  Full use of the left upper extremity     Short term goals:  + Increase left shoulder ROM by 20 degrees (MET)  + Reduce pain by 2 points with movement (MET)  + Independent in a basic HEP per protocol (MET)  + Able to sleep at least 6 hours a night (sleeping without sling)     Long term goals:  + reduce pain to <3/10 (PROGRESSING)  + Full AROM in left shoulder as needed for reaching over head  + 5/5 strength in left shoulder and scapular stabilizers for stability during functional reach and lifting  + Improved posture and independent postural correction for reduced pain and proper biomechanics for optimal shoulder function(PROGRESSING)  + Independent in a HEP for self-management of symptoms.(PROGRESSING)  + Reduce soft tissue restrictions for normal biomechanics and function(PROGRESSING)  + Improve function with reduced reported disability by 10% on ASES  + Able to bathe and dress using the left UE without increased pain(PROGRESSING)  + Able to lift and reach without increased pain or compensation as needed for home and work     PLAN  Progress exercise per protocol as tolerated    Skilled physical therapy 2 times per week for 10 weeks  Treatment may include:  Therapeutic Exercise (11620)  Therapeutic Activity (74281)  Manual therapy (07769)  Neuro-muscular re-education (96629)  Ultrasound (70959)  Unattended Electrical Stimulation (/75647)  Attended Electrical Stimulation (55414)  Light therapy (53092)

## 2025-04-08 ENCOUNTER — APPOINTMENT (OUTPATIENT)
Dept: PRIMARY CARE | Facility: CLINIC | Age: 55
End: 2025-04-08
Payer: COMMERCIAL

## 2025-04-08 ENCOUNTER — TREATMENT (OUTPATIENT)
Dept: PHYSICAL THERAPY | Facility: CLINIC | Age: 55
End: 2025-04-08
Payer: COMMERCIAL

## 2025-04-08 VITALS
BODY MASS INDEX: 33.95 KG/M2 | HEIGHT: 68 IN | OXYGEN SATURATION: 97 % | DIASTOLIC BLOOD PRESSURE: 86 MMHG | HEART RATE: 79 BPM | SYSTOLIC BLOOD PRESSURE: 144 MMHG | WEIGHT: 224 LBS

## 2025-04-08 DIAGNOSIS — M75.112 INCOMPLETE TEAR OF LEFT ROTATOR CUFF, UNSPECIFIED WHETHER TRAUMATIC: ICD-10-CM

## 2025-04-08 DIAGNOSIS — E66.811 CLASS 1 DRUG-INDUCED OBESITY WITHOUT SERIOUS COMORBIDITY WITH BODY MASS INDEX (BMI) OF 33.0 TO 33.9 IN ADULT: Primary | ICD-10-CM

## 2025-04-08 DIAGNOSIS — E66.1 CLASS 1 DRUG-INDUCED OBESITY WITHOUT SERIOUS COMORBIDITY WITH BODY MASS INDEX (BMI) OF 33.0 TO 33.9 IN ADULT: Primary | ICD-10-CM

## 2025-04-08 PROCEDURE — 1036F TOBACCO NON-USER: CPT | Performed by: STUDENT IN AN ORGANIZED HEALTH CARE EDUCATION/TRAINING PROGRAM

## 2025-04-08 PROCEDURE — 99214 OFFICE O/P EST MOD 30 MIN: CPT | Performed by: STUDENT IN AN ORGANIZED HEALTH CARE EDUCATION/TRAINING PROGRAM

## 2025-04-08 PROCEDURE — 97110 THERAPEUTIC EXERCISES: CPT | Mod: GP | Performed by: PHYSICAL THERAPIST

## 2025-04-08 PROCEDURE — 97140 MANUAL THERAPY 1/> REGIONS: CPT | Mod: GP | Performed by: PHYSICAL THERAPIST

## 2025-04-08 PROCEDURE — 3008F BODY MASS INDEX DOCD: CPT | Performed by: STUDENT IN AN ORGANIZED HEALTH CARE EDUCATION/TRAINING PROGRAM

## 2025-04-08 RX ORDER — PHENTERMINE HYDROCHLORIDE 30 MG/1
30 CAPSULE ORAL
Qty: 30 CAPSULE | Refills: 0 | Status: SHIPPED | OUTPATIENT
Start: 2025-04-08 | End: 2025-05-08

## 2025-04-08 RX ORDER — PHENTERMINE HYDROCHLORIDE 30 MG/1
30 CAPSULE ORAL
Qty: 30 CAPSULE | Refills: 0 | Status: SHIPPED | OUTPATIENT
Start: 2025-04-08 | End: 2025-04-08

## 2025-04-08 ASSESSMENT — PATIENT HEALTH QUESTIONNAIRE - PHQ9
2. FEELING DOWN, DEPRESSED OR HOPELESS: NOT AT ALL
1. LITTLE INTEREST OR PLEASURE IN DOING THINGS: NOT AT ALL
SUM OF ALL RESPONSES TO PHQ9 QUESTIONS 1 AND 2: 0

## 2025-04-08 NOTE — PROGRESS NOTES
Physical Therapy Treatment     Patient Name: Yadi Rocha  MRN: [id number]  Today's Date: 4/8/2025  Time Calculation  Start Time: 0830  Stop Time: 0915  Time Calculation (min): 45 min       PT Therapeutic Procedures Time Entry  Manual Therapy Time Entry: 20  Therapeutic Exercise Time Entry: 25    INSURANCE:  Visit Number:13  RE-assessment on visit:  9 on 3/25/25  Insurance Type: Payor: ANTHEM / Plan: ANTHEM TRADITIONAL / Product Type: *No Product type* /   CMS Re-Certification Period:2/17/25 to 5/18/25    Current Problem  1. Incomplete tear of left rotator cuff, unspecified whether traumatic  Follow Up In Physical Therapy          PRECAUTIONS:  work on range of motion  of the extremity. She will not begin any strengthening exercises until she is 3 months postoperative.      PMH: (pertinent to PT evaluation)   none  *See Epic EMR for complete list.    Medical History Form: Reviewed (scanned into chart)    PAIN:    Current 4/10          SUBJECTIVE:  The pt continues to feel stiff and sore.  She is more active and knows.       OBJECTIVE  ===========4/8/25===========================  SHOULDER:           PROM                                                                                             L                       Flexion                          132  degrees                   Extension                      0 degrees          Abduction                     110  degrees       ER at neutral                 30  degrees                             IR at neutral                 to body              ================================================                     PALPATION:   Tightness and tenderness noted in L UT, L cervical muscles including UT. And throughout the L shoulder including scapular muscles     Skin:  Incision is clean and dry  Tender with mild edema over incission      TREATMENT:  Manual Therapy:    supine PROM to L shoulder ER, abduction, and flexion - light  STM to L anterior shoulder in  "supine  STM to entire L shoulder in sitting including UE, deltoid scapular muscles,  and B cervical/UT  PROM Scapular gliding,     Therapeutic Exercise:    UBE   forward 2 minutes   backward 2 minutes    Pulleys   flexion  4 minutes     Finger ladder   Flexion  to  29 and 30  x5    Wand in sitting    Chest press x10   Flexion to 90 degrees x10   ER x10     Shouder ER/horizontal abduction stretch on wall - HEP/DNP  Shoulder IR stretch at counter  - HEP/DNP    Home exercise program: (HEP)  Issued \"**\" exercise as new HEP with written copy issued.   \"*\" exercises are current HEP including:   *Pendulum exercise - DNP    *Standing with UE on table and weight shifting     Lateral   Forward and back    *Table glide in standing    Flexion   Circles    *Ball rolling (redl) on table   CW, CCW, forward and back    Table glides in sitting   *Flexion x10  to 92 degrees   *Abduction x10 to 95  *ER x10    Shoulder IR stretch at counter    Education/instruction:  New:    +Discussed progress    Prior:  Pt educated in:  + current status, general goals, treatment plan  + use of body on arm for hygiene  + Expectations for therapy  + Benefits of self active assistive and passive range of motion  + Added ball rolling to HEP  +Progress  +Weaning from sling  +Able to remove sling with arm supported, especially if fingers are getting  +Realistic Progression  +Expected time frame for therapy  +Precautions  +New HEP  +progress and nature of progression  +use the sling as needed to reduced pain and offer support.    +may use heat or ice  + progress  + edema control  +Discussed new exercise/stretches per doctor    PAIN  End of session pain rating:  reduced after manual - IFC was declined    ASSESSMENT:   Increasing ROM with finger ladder.  Able to progress to wand exercises in sitting with weakness/shaking noted, but able to perform without significant compensation.    Able to lay flat without the pillow under her shoudler.       Will continue " to progress gently as patient tolerates.    Compliance with HEP:     good  Assessment of current progress against goals:    progressing      GOALS:  Pt stated goal:  Full use of the left upper extremity     Short term goals:  + Increase left shoulder ROM by 20 degrees (MET)  + Reduce pain by 2 points with movement (MET)  + Independent in a basic HEP per protocol (MET)  + Able to sleep at least 6 hours a night (sleeping without sling)     Long term goals:  + reduce pain to <3/10 (PROGRESSING)  + Full AROM in left shoulder as needed for reaching over head  + 5/5 strength in left shoulder and scapular stabilizers for stability during functional reach and lifting  + Improved posture and independent postural correction for reduced pain and proper biomechanics for optimal shoulder function(PROGRESSING)  + Independent in a HEP for self-management of symptoms.(PROGRESSING)  + Reduce soft tissue restrictions for normal biomechanics and function(PROGRESSING)  + Improve function with reduced reported disability by 10% on ASES  + Able to bathe and dress using the left UE without increased pain(PROGRESSING)  + Able to lift and reach without increased pain or compensation as needed for home and work     PLAN  Progress exercise per protocol as tolerated    Skilled physical therapy 2 times per week for 10 weeks  Treatment may include:  Therapeutic Exercise (33246)  Therapeutic Activity (18379)  Manual therapy (80990)  Neuro-muscular re-education (49341)  Ultrasound (25170)  Unattended Electrical Stimulation (/20332)  Attended Electrical Stimulation (43069)  Light therapy (65885)

## 2025-04-08 NOTE — PROGRESS NOTES
"  Subjective   Patient ID:   Yadi Rocha is a  55 y.o. female who presents for Follow-up (Patient is here today for a follow up on her weight. Patient does not feel like it is helping. ).     HPI  Pt has been having insomnia with her phentermine   She has been taking it at 7 am but does have some symptoms of insomnia at night time. Pt has also noted increase in hair growth on face and acne.           Current Outpatient Medications:     acetaminophen (Tylenol) 500 mg tablet, Take 2 tablets (1,000 mg) by mouth every 8 hours if needed for mild pain (1 - 3) (Take 2 tablets every 8 hours for 5 days, then after 5 days take 2 tablets as needed for pain)., Disp: 90 tablet, Rfl: 0    cholecalciferol (Vitamin D3) 50 mcg (2,000 unit) capsule, Take by mouth., Disp: , Rfl:     cyanocobalamin, vitamin B-12, 1,000 mcg/mL drops, Take by mouth., Disp: , Rfl:     gabapentin (Neurontin) 300 mg capsule, Take 1 capsule (300 mg) by mouth once daily at bedtime for 5 days., Disp: 5 capsule, Rfl: 0    oxyCODONE (Roxicodone) 5 mg immediate release tablet, Take 1 tablet (5 mg) by mouth every 6 hours if needed for severe pain (7 - 10) (Do not take within 3 hours of GABAPENTIN)., Disp: 28 tablet, Rfl: 0    phentermine 30 mg capsule, Take 1 capsule (30 mg) by mouth once daily in the morning. Take before meals., Disp: 30 capsule, Rfl: 0    Visit Vitals  /86   Pulse 79   Ht 1.727 m (5' 8\")   Wt 102 kg (224 lb)   SpO2 97%   BMI 34.06 kg/m²   OB Status Postmenopausal   Smoking Status Never   BSA 2.21 m²     Wt Readings from Last 10 Encounters:   04/08/25 102 kg (224 lb)   04/02/25 95.7 kg (211 lb)   02/20/25 102 kg (225 lb)   02/05/25 99.3 kg (219 lb)   01/23/25 99.6 kg (219 lb 9.3 oz)   01/09/25 100 kg (220 lb 14.4 oz)   01/08/25 99.7 kg (219 lb 12.8 oz)   12/11/24 98 kg (216 lb)   09/20/24 98.4 kg (217 lb)   09/16/24 98.4 kg (217 lb)       Objective   Physical Exam  Vitals reviewed.   Constitutional:       Appearance: Normal " appearance.   Cardiovascular:      Rate and Rhythm: Normal rate and regular rhythm.      Heart sounds: No murmur heard.  Pulmonary:      Effort: Pulmonary effort is normal. No respiratory distress.      Breath sounds: Normal breath sounds. No wheezing.   Musculoskeletal:      Cervical back: Neck supple.      Left lower leg: No edema.   Neurological:      Mental Status: She is alert.           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 Take before meals.      Left Rotator cuff tear  S/p 1/23/25 will be getting left rotator cuff repair  with Dr. Angela  S/p physical therapy   Pt has been cleared for walking      Obesity   BMI 34.21 kg/m2  GLP-1 agonist is no longer covered by her insurance.   Exercise is not currently an option due to recent surgery  Medications: Wellbutrin (severe leg cramping)  Discussed Topamax and phentermine   Physical exam was stable. Discussed maintaining diets such as DASH to help maintain normal  Phentermine was reduced to 30mg daily   Advised to take in the morning     Toshia Lawson DO 03/12/25 2:02 PM

## 2025-04-09 PROBLEM — E66.811 OBESITY, CLASS I, BMI 30-34.9: Status: ACTIVE | Noted: 2025-04-09

## 2025-04-10 ENCOUNTER — TREATMENT (OUTPATIENT)
Dept: PHYSICAL THERAPY | Facility: CLINIC | Age: 55
End: 2025-04-10
Payer: COMMERCIAL

## 2025-04-10 DIAGNOSIS — M75.112 INCOMPLETE TEAR OF LEFT ROTATOR CUFF, UNSPECIFIED WHETHER TRAUMATIC: ICD-10-CM

## 2025-04-10 PROCEDURE — 97140 MANUAL THERAPY 1/> REGIONS: CPT | Mod: GP | Performed by: PHYSICAL THERAPIST

## 2025-04-10 PROCEDURE — 97014 ELECTRIC STIMULATION THERAPY: CPT | Mod: GP | Performed by: PHYSICAL THERAPIST

## 2025-04-10 PROCEDURE — 97035 APP MDLTY 1+ULTRASOUND EA 15: CPT | Mod: GP | Performed by: PHYSICAL THERAPIST

## 2025-04-10 NOTE — PROGRESS NOTES
Physical Therapy Treatment     Patient Name: Yadi Rocha  MRN: [id number]  Today's Date: 4/10/2025  Time Calculation  Start Time: 0915  Stop Time: 1005  Time Calculation (min): 50 min    PT Modalities Time Entry  E-Stim (Unattended) Time Entry: 15  Ultrasound Time Entry: 8  PT Therapeutic Procedures Time Entry  Manual Therapy Time Entry: 22    INSURANCE:  Visit Number:13  RE-assessment on visit:  9 on 3/25/25  Insurance Type: Payor: ANTHEM / Plan: ANTHEM TRADITIONAL / Product Type: *No Product type* /   CMS Re-Certification Period:2/17/25 to 5/18/25    Current Problem  1. Incomplete tear of left rotator cuff, unspecified whether traumatic  Follow Up In Physical Therapy          PRECAUTIONS:  work on range of motion  of the extremity. She will not begin any strengthening exercises until she is 3 months postoperative.      PMH: (pertinent to PT evaluation)   none  *See Epic EMR for complete list.    Medical History Form: Reviewed (scanned into chart)    PAIN:    Current 6.5/10 - constant.            SUBJECTIVE:  The pt had pain in the middle of the night the evening after therapy.  She isn't sure what she did ( too much actvity, slept funny, or PT.  Ibuprofen doesn't change it.  )  She didn;t sleep well again last night      OBJECTIVE  ===========4/8/25===========================  SHOULDER:           PROM                                                                                             L                       Flexion                          132  degrees                   Extension                      0 degrees          Abduction                     110  degrees       ER at neutral                 30  degrees                             IR at neutral                 to body              ================================================                     TREATMENT:  Modalities:   Ultrasound (US)  was performed to L UT at 1.0w/cm2 and 1MHz  at 100% for 8 minutes  Interferential electric stimulation  "(IFC) performed to L shoulder   at  Hz with sweep for 15 minutes to strong sensation       Manual Therapy:    supine PROM to L shoulder ER, abduction, and flexion - light - DNP  STM to entire L shoulder in sitting including UE, deltoid scapular muscles,  and B cervical/UT  PROM Scapular gliding,   Kinesio Tape applied  with 2 I strips to L supraspinatus and corrective tape into retraction    Therapeutic Exercise:    ================Deferred==============================  UBE   forward 2 minutes   backward 2 minutes    Pulleys   flexion  4 minutes     Finger ladder   Flexion  to  29 and 30  x5    Wand in sitting    Chest press x10   Flexion to 90 degrees x10   ER x10     Shouder ER/horizontal abduction stretch on wall - HEP/DNP  Shoulder IR stretch at counter  - HEP/DNP  ==================================================  Home exercise program: (HEP)  Issued \"**\" exercise as new HEP with written copy issued.   \"*\" exercises are current HEP including:   *Pendulum exercise - DNP    *Standing with UE on table and weight shifting     Lateral   Forward and back    *Table glide in standing    Flexion   Circles    *Ball rolling (redl) on table   CW, CCW, forward and back    Table glides in sitting   *Flexion x10  to 92 degrees   *Abduction x10 to 95  *ER x10    Shoulder IR stretch at counter    Education/instruction:  New:    +Pt was educated on the purpose and use of kinesio tape including precautions, and safe removal directions.  Prior:  Pt educated in:  + current status, general goals, treatment plan  + use of body on arm for hygiene  + Expectations for therapy  + Benefits of self active assistive and passive range of motion  + Added ball rolling to HEP  +Progress  +Weaning from sling  +Able to remove sling with arm supported, especially if fingers are getting  +Realistic Progression  +Expected time frame for therapy  +Precautions  +New HEP  +progress and nature of progression  +use the sling as needed to reduced " pain and offer support.    +may use heat or ice  + progress  + edema control  +Discussed new exercise/stretches per doctor  +Discussed progress    PAIN  End of session pain rating: Slightly reduced  ASSESSMENT:   The patient has significant increase in pain over the last few days.  She has strong spasm in the left upper trap and supraspinatus which loosened, but did not fully release with manual therapy and modalities on this date.  Exercises were deferred due to increased pain.     Will continue to progress gently as patient tolerates.    Compliance with HEP:     good  Assessment of current progress against goals:    progressing      GOALS:  Pt stated goal:  Full use of the left upper extremity     Short term goals:  + Increase left shoulder ROM by 20 degrees (MET)  + Reduce pain by 2 points with movement (MET)  + Independent in a basic HEP per protocol (MET)  + Able to sleep at least 6 hours a night (sleeping without sling)     Long term goals:  + reduce pain to <3/10 (PROGRESSING)  + Full AROM in left shoulder as needed for reaching over head  + 5/5 strength in left shoulder and scapular stabilizers for stability during functional reach and lifting  + Improved posture and independent postural correction for reduced pain and proper biomechanics for optimal shoulder function(PROGRESSING)  + Independent in a HEP for self-management of symptoms.(PROGRESSING)  + Reduce soft tissue restrictions for normal biomechanics and function(PROGRESSING)  + Improve function with reduced reported disability by 10% on ASES  + Able to bathe and dress using the left UE without increased pain(PROGRESSING)  + Able to lift and reach without increased pain or compensation as needed for home and work     PLAN  Resume exercise per protocol as tolerated    Skilled physical therapy 2 times per week for 10 weeks  Treatment may include:  Therapeutic Exercise (40128)  Therapeutic Activity (13386)  Manual therapy (20836)  Neuro-muscular  re-education (48367)  Ultrasound (86285)  Unattended Electrical Stimulation (/87666)  Attended Electrical Stimulation (08340)  Light therapy (93422)

## 2025-04-11 ENCOUNTER — APPOINTMENT (OUTPATIENT)
Dept: PHYSICAL THERAPY | Facility: CLINIC | Age: 55
End: 2025-04-11
Payer: COMMERCIAL

## 2025-04-15 ENCOUNTER — TREATMENT (OUTPATIENT)
Dept: PHYSICAL THERAPY | Facility: CLINIC | Age: 55
End: 2025-04-15
Payer: COMMERCIAL

## 2025-04-15 DIAGNOSIS — M75.112 INCOMPLETE TEAR OF LEFT ROTATOR CUFF, UNSPECIFIED WHETHER TRAUMATIC: ICD-10-CM

## 2025-04-15 PROCEDURE — 97110 THERAPEUTIC EXERCISES: CPT | Mod: GP | Performed by: PHYSICAL THERAPIST

## 2025-04-15 PROCEDURE — 97140 MANUAL THERAPY 1/> REGIONS: CPT | Mod: GP | Performed by: PHYSICAL THERAPIST

## 2025-04-15 NOTE — PROGRESS NOTES
Physical Therapy Treatment     Patient Name: Yadi Rocha  MRN: [id number]  Today's Date: 4/15/2025  Time Calculation  Start Time: 0915  Stop Time: 1000  Time Calculation (min): 45 min       PT Therapeutic Procedures Time Entry  Manual Therapy Time Entry: 20  Therapeutic Exercise Time Entry: 25    INSURANCE:  Visit Number:13  RE-assessment on visit:  9 on 3/25/25  Insurance Type: Payor: ANTHEM / Plan: ANTHEM TRADITIONAL / Product Type: *No Product type* /   CMS Re-Certification Period:2/17/25 to 5/18/25    Current Problem  1. Incomplete tear of left rotator cuff, unspecified whether traumatic  Follow Up In Physical Therapy          PRECAUTIONS:  work on range of motion  of the extremity. She will not begin any strengthening exercises until she is 3 months postoperative.      PMH: (pertinent to PT evaluation)   none  *See Epic EMR for complete list.    Medical History Form: Reviewed (scanned into chart)    PAIN:    Current mild     SUBJECTIVE:  The pt felt much better after last treatment, noted after leaving therapy.  She has not had a recurrence of   the intense pain since.      She feels her R arm is getting weak. She has had testing and lost weight in muscle mass.      OBJECTIVE  ===========4/8/25===========================  SHOULDER:           PROM                                                                                             L                       Flexion                          132  degrees                   Extension                      0 degrees          Abduction                     110  degrees       ER at neutral                 30  degrees                             IR at neutral                 to body              ================================================                     TREATMENT:  Manual Therapy:    supine PROM to L shoulder ER, abduction, and flexion   STM to entire L shoulder in sitting including UE, deltoid scapular muscles,  and L cervical/UT  PROM Scapular  "gliding,       Therapeutic Exercise:    UBE   forward 2 minutes   backward 2 minutes    Pulleys   flexion  4 minutes     Finger ladder   Flexion  to  30  x5    Wand in sitting    Chest press x10   Flexion to 90 degrees x10   ER x15    B shoulder retraction.    *Strengthening exercises for the none surgical (R) UE with GTB x10 each   IR   ER   Row   Bicep curl   Tricep curl       Shouder ER/horizontal abduction stretch on wall - HEP/DNP  Shoulder IR stretch at counter  - HEP/DNP  ==================================================  Home exercise program: (HEP)  Issued \"**\" exercise as new HEP with written copy issued.   \"*\" exercises are current HEP including:   *Pendulum exercise - DNP    *Standing with UE on table and weight shifting     Lateral   Forward and back    *Table glide in standing    Flexion   Circles    *Ball rolling (redl) on table   CW, CCW, forward and back    Table glides in sitting   *Flexion x10  to 92 degrees   *Abduction x10 to 95  *ER x10    Shoulder IR stretch at counter    *Strengthening exercises for the none surgical (R) UE with GTB x10 each   IR   ER   Row   Bicep curl   Tricep curl    Education/instruction:  New:    + added exercise to R UE due to reported weakness    Prior:  Pt educated in:  + current status, general goals, treatment plan  + use of body on arm for hygiene  + Expectations for therapy  + Benefits of self active assistive and passive range of motion  + Added ball rolling to HEP  +Weaning from sling  +Able to remove sling with arm supported, especially if fingers are getting  +Realistic Progression  +Expected time frame for therapy  +Precautions  +New HEP  +progress and nature of progression  +use the sling as needed to reduced pain and offer support.    +may use heat or ice  + edema control  +Discussed new exercise/stretches per doctor  +Discussed progress  +Pt was educated on the purpose and use of kinesio tape including precautions, and safe removal " directions.    PAIN  End of session pain rating: mild    ASSESSMENT:   The patient was able to resume prior level of exercises without increased pain.  She does have tightness in the left shoulder and cervical which was addressed with manual, passive range of motion, and active assistive range of motion exercises.    The patient was able to add strengthening exercises to the nonsurgical (right) upper extremity with a ease.  Green Thera-Band issued.  Compliance with HEP:     good  Assessment of current progress against goals:    progressing      GOALS:  Pt stated goal:  Full use of the left upper extremity     Short term goals:  + Increase left shoulder ROM by 20 degrees (MET)  + Reduce pain by 2 points with movement (MET)  + Independent in a basic HEP per protocol (MET)  + Able to sleep at least 6 hours a night (sleeping without sling)     Long term goals:  + reduce pain to <3/10 (PROGRESSING)  + Full AROM in left shoulder as needed for reaching over head  + 5/5 strength in left shoulder and scapular stabilizers for stability during functional reach and lifting  + Improved posture and independent postural correction for reduced pain and proper biomechanics for optimal shoulder function(PROGRESSING)  + Independent in a HEP for self-management of symptoms.(PROGRESSING)  + Reduce soft tissue restrictions for normal biomechanics and function(PROGRESSING)  + Improve function with reduced reported disability by 10% on ASES  + Able to bathe and dress using the left UE without increased pain(PROGRESSING)  + Able to lift and reach without increased pain or compensation as needed for home and work     PLAN  Progress strengthening exercise per protocol as tolerated    Skilled physical therapy 2 times per week for 10 weeks  Treatment may include:  Therapeutic Exercise (05840)  Therapeutic Activity (07581)  Manual therapy (56479)  Neuro-muscular re-education (26706)  Ultrasound (93172)  Unattended Electrical Stimulation  (/52129)  Attended Electrical Stimulation (53438)  Light therapy (39126)

## 2025-04-17 ENCOUNTER — TREATMENT (OUTPATIENT)
Dept: PHYSICAL THERAPY | Facility: CLINIC | Age: 55
End: 2025-04-17
Payer: COMMERCIAL

## 2025-04-17 DIAGNOSIS — M75.112 INCOMPLETE TEAR OF LEFT ROTATOR CUFF, UNSPECIFIED WHETHER TRAUMATIC: ICD-10-CM

## 2025-04-17 PROCEDURE — 97110 THERAPEUTIC EXERCISES: CPT | Mod: GP | Performed by: PHYSICAL THERAPIST

## 2025-04-17 PROCEDURE — 97140 MANUAL THERAPY 1/> REGIONS: CPT | Mod: GP | Performed by: PHYSICAL THERAPIST

## 2025-04-17 NOTE — PROGRESS NOTES
Physical Therapy Treatment     Patient Name: Yadi Rocha  MRN: [id number]  Today's Date: 4/17/2025  Time Calculation  Start Time: 0740  Stop Time: 0810  Time Calculation (min): 30 min       PT Therapeutic Procedures Time Entry  Manual Therapy Time Entry: 10  Therapeutic Exercise Time Entry: 20    INSURANCE:  Visit Number:13  RE-assessment on visit:  9 on 3/25/25  Insurance Type: Payor: ANTHEM / Plan: ANTHEM TRADITIONAL / Product Type: *No Product type* /   CMS Re-Certification Period:2/17/25 to 5/18/25    Current Problem  1. Incomplete tear of left rotator cuff, unspecified whether traumatic  Follow Up In Physical Therapy          PRECAUTIONS:  work on range of motion  of the extremity. She will not begin any strengthening exercises until she is 3 months postoperative.      PMH: (pertinent to PT evaluation)   none  *See Epic EMR for complete list.    Medical History Form: Reviewed (scanned into chart)    PAIN:    Current mild     SUBJECTIVE:  The pt 's arm is more sore than the pinching pain of last week.  Typing even makes it sore.  However, she is much better than last week.      The pt needs a short treatment due to work demands.  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    OBJECTIVE  SHOULDER:           AAROM                                                                                             L                       Flexion                          140  degrees     (with wand)                                 TREATMENT:  Manual Therapy:    supine PROM to L shoulder ER, abduction, and flexion   STM to entire L shoulder in sitting including UE, deltoid scapular muscles,  and L cervical/UT - brief  PROM Scapular gliding, - DNP      Therapeutic Exercise:    UBE   forward 2 minutes   backward 2 minutes    Pulleys   flexion  3 minutes     Finger ladder   Flexion  to  30  x3   and to 31 x1    Wand in sitting    Chest press x10   Flexion to 90 degrees x10   Horizontal abd/add  x10    Wand  "in supine   Flexion (full ROM)  x10   Ceiling press  x15    Elbow   Flexion 2# x20   Extension  GTB (2 thickness)  x10    ============DNP============================  B shoulder retraction.    *Strengthening exercises for the none surgical (R) UE with GTB x10 each   IR   ER   Row   Bicep curl   Tricep curl    Shoulder ER/horizontal abduction stretch on wall - HEP/DNP  Shoulder IR stretch at counter  - HEP/DNP  ==================================================    Home exercise program: (HEP)  Issued \"**\" exercise as new HEP with written copy issued.   \"*\" exercises are current HEP including:   *Pendulum exercise - DNP    *Standing with UE on table and weight shifting     Lateral   Forward and back    *Table glide in standing    Flexion   Circles    *Ball rolling (redl) on table   CW, CCW, forward and back    Table glides in sitting   *Flexion x10  to 92 degrees   *Abduction x10 to 95  *ER x10    Shoulder IR stretch at counter    *Strengthening exercises for the none surgical (R) UE with GTB x10 each   IR   ER   Row   Bicep curl   Tricep curl    Education/instruction:  New:    + R UE exercises are optional;    Prior:  Pt educated in:  + current status, general goals, treatment plan  + use of body on arm for hygiene  + Expectations for therapy  + Benefits of self active assistive and passive range of motion  + Added ball rolling to HEP  +Weaning from sling  +Able to remove sling with arm supported, especially if fingers are getting  +Realistic Progression  +Expected time frame for therapy  +Precautions  +New HEP  +progress and nature of progression  +use the sling as needed to reduced pain and offer support.    +may use heat or ice  + edema control  +Discussed new exercise/stretches per doctor  +Discussed progress  +Pt was educated on the purpose and use of kinesio tape including precautions, and safe removal directions.  +added exercise to R UE due to reported weakness  PAIN  End of session pain rating:  " "\"good\"    ASSESSMENT:   Despite a short treatment due to patient's work schedule, she was able to perform all exercises and progress into elbow strengthening exercises.  She increased her range of motion on the finger ladder by 1 point.  Measured range of motion with supine wand exercises up to 140 degrees.  She is not experiencing pain as last week.  She has mild tightness in the shoulder and mild to moderate tightness in the cervical region but did not have pain with treatment today.    Compliance with HEP:     good  Assessment of current progress against goals:    progressing      GOALS:  Pt stated goal:  Full use of the left upper extremity     Short term goals:  + Increase left shoulder ROM by 20 degrees (MET)  + Reduce pain by 2 points with movement (MET)  + Independent in a basic HEP per protocol (MET)  + Able to sleep at least 6 hours a night (sleeping without sling)     Long term goals:  + reduce pain to <3/10 (PROGRESSING)  + Full AROM in left shoulder as needed for reaching over head  + 5/5 strength in left shoulder and scapular stabilizers for stability during functional reach and lifting  + Improved posture and independent postural correction for reduced pain and proper biomechanics for optimal shoulder function(PROGRESSING)  + Independent in a HEP for self-management of symptoms.(PROGRESSING)  + Reduce soft tissue restrictions for normal biomechanics and function(PROGRESSING)  + Improve function with reduced reported disability by 10% on ASES  + Able to bathe and dress using the left UE without increased pain(PROGRESSING)  + Able to lift and reach without increased pain or compensation as needed for home and work     PLAN  Progress strengthening exercise per protocol as tolerated    Skilled physical therapy 2 times per week for 10 weeks  Treatment may include:  Therapeutic Exercise (90847)  Therapeutic Activity (83859)  Manual therapy (94043)  Neuro-muscular re-education (10447)  Ultrasound " (44304)  Unattended Electrical Stimulation (/94397)  Attended Electrical Stimulation (49689)  Light therapy (74650)

## 2025-04-22 ENCOUNTER — TREATMENT (OUTPATIENT)
Dept: PHYSICAL THERAPY | Facility: CLINIC | Age: 55
End: 2025-04-22
Payer: COMMERCIAL

## 2025-04-22 DIAGNOSIS — M75.112 INCOMPLETE TEAR OF LEFT ROTATOR CUFF, UNSPECIFIED WHETHER TRAUMATIC: ICD-10-CM

## 2025-04-22 PROCEDURE — 97140 MANUAL THERAPY 1/> REGIONS: CPT | Mod: GP | Performed by: PHYSICAL THERAPIST

## 2025-04-22 PROCEDURE — 97110 THERAPEUTIC EXERCISES: CPT | Mod: GP | Performed by: PHYSICAL THERAPIST

## 2025-04-22 NOTE — PROGRESS NOTES
Physical Therapy Treatment     Patient Name: Yadi Rocha  MRN: [id number]  Today's Date: 4/22/2025               INSURANCE:  Visit Number:14  RE-assessment on visit:  9 on 3/25/25  Insurance Type: Payor: ADELA / Plan: ADELA TRADITIONAL / Product Type: *No Product type* /   CMS Re-Certification Period:2/17/25 to 5/18/25    Current Problem  1. Incomplete tear of left rotator cuff, unspecified whether traumatic  Follow Up In Physical Therapy          PRECAUTIONS:  work on range of motion  of the extremity. She will not begin any strengthening exercises until she is 3 months postoperative.      PMH: (pertinent to PT evaluation)   none  *See Epic EMR for complete list.    Medical History Form: Reviewed (scanned into chart)    PAIN:    Current mild     SUBJECTIVE:  The pt is continuing to be active at home.  Pain is controled.      OBJECTIVE  SHOULDER:           AAROM                                                                                             L                       Flexion                          140  degrees    ER                                   37  degrees  Abduction                      95  degree                                 TREATMENT:  Manual Therapy:    supine PROM to L shoulder ER, abduction, and flexion         Therapeutic Exercise:    UBE   forward 2 minutes   backward 2 minutes    Pulleys   flexion  3 minutes     Finger ladder   Flexion  to  31 x5    Wand in sitting    Chest press x10   Flexion to 90 degrees x10   Horizontal abd/add  x10    Wand in supine   Flexion (full ROM)  x10   Ceiling press  x15    Elbow   Flexion 2# x20    Extension  GTB (1 thickness)  x10    ============DNP============================  B shoulder retraction.    *Strengthening exercises for the none surgical (R) UE with GTB x10 each   IR   ER   Row   Bicep curl   Tricep curl    Shoulder ER/horizontal abduction stretch on wall - HEP/DNP  Shoulder IR stretch at counter  -  "HEP/DNP  ==================================================    Home exercise program: (HEP)  Issued \"**\" exercise as new HEP with written copy issued.   \"*\" exercises are current HEP including:   *Pendulum exercise - DNP    *Standing with UE on table and weight shifting     Lateral   Forward and back    *Table glide in standing    Flexion   Circles    *Ball rolling (redl) on table   CW, CCW, forward and back    Table glides in sitting   *Flexion x10  to 92 degrees   *Abduction x10 to 95  *ER x10    Shoulder IR stretch at counter    *Strengthening exercises for the none surgical (R) UE with GTB x10 each   IR   ER   Row   Bicep curl   Tricep curl    Education/instruction:  New:    + continue HEP    Prior:  Pt educated in:  + current status, general goals, treatment plan  + use of body on arm for hygiene  + Expectations for therapy  + Benefits of self active assistive and passive range of motion  + Added ball rolling to HEP  +Weaning from sling  +Able to remove sling with arm supported, especially if fingers are getting  +Realistic Progression  +Expected time frame for therapy  +Precautions  +New HEP  +progress and nature of progression  +use the sling as needed to reduced pain and offer support.    +may use heat or ice  + edema control  +Discussed new exercise/stretches per doctor  +Discussed progress  +Pt was educated on the purpose and use of kinesio tape including precautions, and safe removal directions.  +added exercise to R UE due to reported weakness  + R UE exercises are optional;    PAIN  End of session pain rating:  \"good\"    ASSESSMENT:   The pt is progressing well with less pain and improved overall ROM with AAROM exercises. She has discomfort at end-range, but no excessive pain.        Compliance with HEP:     good  Assessment of current progress against goals:    progressing      GOALS:  Pt stated goal:  Full use of the left upper extremity     Short term goals:  + Increase left shoulder ROM by 20 " degrees (MET)  + Reduce pain by 2 points with movement (MET)  + Independent in a basic HEP per protocol (MET)  + Able to sleep at least 6 hours a night (sleeping without sling)     Long term goals:  + reduce pain to <3/10 (PROGRESSING)  + Full AROM in left shoulder as needed for reaching over head  + 5/5 strength in left shoulder and scapular stabilizers for stability during functional reach and lifting  + Improved posture and independent postural correction for reduced pain and proper biomechanics for optimal shoulder function(PROGRESSING)  + Independent in a HEP for self-management of symptoms.(PROGRESSING)  + Reduce soft tissue restrictions for normal biomechanics and function(PROGRESSING)  + Improve function with reduced reported disability by 10% on ASES  + Able to bathe and dress using the left UE without increased pain(PROGRESSING)  + Able to lift and reach without increased pain or compensation as needed for home and work     PLAN  Progress strengthening exercise per protocol as tolerated    Skilled physical therapy 2 times per week for 10 weeks  Treatment may include:  Therapeutic Exercise (33547)  Therapeutic Activity (63662)  Manual therapy (97825)  Neuro-muscular re-education (20650)  Ultrasound (07428)  Unattended Electrical Stimulation (/63681)  Attended Electrical Stimulation (55608)  Light therapy (02960)

## 2025-04-24 ENCOUNTER — TREATMENT (OUTPATIENT)
Dept: PHYSICAL THERAPY | Facility: CLINIC | Age: 55
End: 2025-04-24
Payer: COMMERCIAL

## 2025-04-24 DIAGNOSIS — M75.112 INCOMPLETE TEAR OF LEFT ROTATOR CUFF, UNSPECIFIED WHETHER TRAUMATIC: ICD-10-CM

## 2025-04-24 PROCEDURE — 97140 MANUAL THERAPY 1/> REGIONS: CPT | Mod: GP | Performed by: PHYSICAL THERAPIST

## 2025-04-24 PROCEDURE — 97110 THERAPEUTIC EXERCISES: CPT | Mod: GP | Performed by: PHYSICAL THERAPIST

## 2025-04-24 NOTE — PROGRESS NOTES
Physical Therapy Treatment     Patient Name: Yadi Rocha  MRN: [id number]  Today's Date: 4/24/2025  Time Calculation  Start Time: 0920  Stop Time: 1002  Time Calculation (min): 42 min       PT Therapeutic Procedures Time Entry  Manual Therapy Time Entry: 15  Therapeutic Exercise Time Entry: 25    INSURANCE:  Visit Number:20  RE-assessment on visit:  9 on 3/25/25  Insurance Type: Payor: ANTHEM / Plan: ANTHEM TRADITIONAL / Product Type: *No Product type* /   CMS Re-Certification Period:2/17/25 to 5/18/25    Current Problem  1. Incomplete tear of left rotator cuff, unspecified whether traumatic  Follow Up In Physical Therapy          PRECAUTIONS:  work on range of motion  of the extremity. She will not begin any strengthening exercises until she is 3 months postoperative.      PMH: (pertinent to PT evaluation)   none  *See Epic EMR for complete list.    Medical History Form: Reviewed (scanned into chart)    PAIN:    Current sore     SUBJECTIVE:  The pt is sore today after being very active ar work.   HEP are going well except wall ER stretch. The others are sore, but OK     OBJECTIVE  SHOULDER:           AAROM                                                                                             L                       Flexion                          140  degrees    ER                                   37  degrees  Abduction                      95  degree                                 TREATMENT:  Manual Therapy:    supine PROM to L shoulder ER, abduction, and flexion         Therapeutic Exercise:    UBE   forward 2 minutes   backward 2 minutes    Pulleys   flexion  2 minutes     Finger ladder   Flexion  to  31 x5    Wand in standing    ER   Abduction   Chest press x10   Flexion to 90 degrees x10   Horizontal abd/add  x10     in supine   Flexion (full ROM)  x10 with clasped hands   Ceiling press  x15 - DNP      ============DNP============================    Elbow   Flexion 2# x20    Extension  GTB (1  "thickness)  x10B shoulder retraction.    *Strengthening exercises for the none surgical (R) UE with GTB x10 each   IR   ER   Row   Bicep curl   Tricep curl    Shoulder ER/horizontal abduction stretch on wall - HEP/DNP  Shoulder IR stretch at counter  - HEP/DNP  ==================================================    Home exercise program: (HEP)  Issued \"**\" exercise as new HEP with written copy issued.   \"*\" exercises are current HEP including:   *Pendulum exercise - DNP    *Standing with UE on table and weight shifting     Lateral   Forward and back    *Table glide in standing    Flexion   Circles    *Ball rolling (redl) on table   CW, CCW, forward and back    Table glides in sitting   *Flexion x10  to 92 degrees   *Abduction x10 to 95  *ER x10    Shoulder IR stretch at counter    *Strengthening exercises for the none surgical (R) UE with GTB x10 each   IR   ER   Row   Bicep curl   Tricep curl    Education/instruction:  New:    + continue HEP    Prior:  Pt educated in:  + current status, general goals, treatment plan  + use of body on arm for hygiene  + Expectations for therapy  + Benefits of self active assistive and passive range of motion  + Added ball rolling to HEP  +Weaning from sling  +Able to remove sling with arm supported, especially if fingers are getting  +Realistic Progression  +Expected time frame for therapy  +Precautions  +New HEP  +progress and nature of progression  +use the sling as needed to reduced pain and offer support.    +may use heat or ice  + edema control  +Discussed new exercise/stretches per doctor  +Discussed progress  +Pt was educated on the purpose and use of kinesio tape including precautions, and safe removal directions.  +added exercise to R UE due to reported weakness  + R UE exercises are optional;    PAIN  End of session pain rating:  better    ASSESSMENT:   The pt is progressing well with tightness in the UT and anterior deltoid that reduced with manual.  Exchanged wall ER " stretch for cane stretch with pulling, but not pain.     Compliance with HEP:     good  Assessment of current progress against goals:    progressing      GOALS:  Pt stated goal:  Full use of the left upper extremity     Short term goals:  + Increase left shoulder ROM by 20 degrees (MET)  + Reduce pain by 2 points with movement (MET)  + Independent in a basic HEP per protocol (MET)  + Able to sleep at least 6 hours a night (sleeping without sling)     Long term goals:  + reduce pain to <3/10 (PROGRESSING)  + Full AROM in left shoulder as needed for reaching over head  + 5/5 strength in left shoulder and scapular stabilizers for stability during functional reach and lifting  + Improved posture and independent postural correction for reduced pain and proper biomechanics for optimal shoulder function(PROGRESSING)  + Independent in a HEP for self-management of symptoms.(PROGRESSING)  + Reduce soft tissue restrictions for normal biomechanics and function(PROGRESSING)  + Improve function with reduced reported disability by 10% on ASES  + Able to bathe and dress using the left UE without increased pain(PROGRESSING)  + Able to lift and reach without increased pain or compensation as needed for home and work     PLAN  Progress strengthening exercise per protocol as tolerated    Skilled physical therapy 2 times per week for 10 weeks  Treatment may include:  Therapeutic Exercise (02694)  Therapeutic Activity (12165)  Manual therapy (57090)  Neuro-muscular re-education (92167)  Ultrasound (43359)  Unattended Electrical Stimulation (/87556)  Attended Electrical Stimulation (30677)  Light therapy (35892)

## 2025-04-29 ENCOUNTER — TREATMENT (OUTPATIENT)
Dept: PHYSICAL THERAPY | Facility: CLINIC | Age: 55
End: 2025-04-29
Payer: COMMERCIAL

## 2025-04-29 DIAGNOSIS — M75.112 INCOMPLETE TEAR OF LEFT ROTATOR CUFF, UNSPECIFIED WHETHER TRAUMATIC: ICD-10-CM

## 2025-04-29 PROCEDURE — 97110 THERAPEUTIC EXERCISES: CPT | Mod: GP | Performed by: PHYSICAL THERAPIST

## 2025-04-29 PROCEDURE — 97140 MANUAL THERAPY 1/> REGIONS: CPT | Mod: GP | Performed by: PHYSICAL THERAPIST

## 2025-04-29 NOTE — PROGRESS NOTES
Physical Therapy Treatment and Re-assessment     Patient Name: Yadi Rocha  MRN: [id number]  Today's Date: 4/29/2025  Time Calculation  Start Time: 0915  Stop Time: 1000  Time Calculation (min): 45 min       PT Therapeutic Procedures Time Entry  Manual Therapy Time Entry: 15  Therapeutic Exercise Time Entry: 25    INSURANCE:  Visit Number:20  RE-assessment on visit:  9 on 3/25/25  Insurance Type: Payor: ANTHEM / Plan: ANTHEM TRADITIONAL / Product Type: *No Product type* /   CMS Re-Certification Period:2/17/25 to 5/18/25    Current Problem  1. Incomplete tear of left rotator cuff, unspecified whether traumatic  Follow Up In Physical Therapy          PRECAUTIONS:  work on range of motion  of the extremity. She will not begin any strengthening exercises until she is 3 months postoperative.      PMH: (pertinent to PT evaluation)   none  *See Epic EMR for complete list.    Medical History Form: Reviewed (scanned into chart)    PAIN:    Current:  0/10  Range 0/10 to 4/10    Other Measures  Other Outcome Measures: ASES      16      SUBJECTIVE:  The pt is sore and achy. She is more active and even did some painting.  She has avoided lifting  per doctor instructions.  Awaiting the 3 month point to be able to start strengthening.   She can get the arm higher, but has to go slowly.  Can't lay on the L side yet, but sleeping better (except hip pain)    OBJECTIVE  POSTURE:  Patient no longer supports her arm with the other UE at times  Mild Protraction of L shoulder    L SHOULDER:           AAROM                                                                       Flexion                          145  degrees                   Scaption  138  degrees    Extension                      50  degrees          Abduction                     135 degrees       ER at neutral                 50  degrees                             IR at neutral                 to sacrum                  SHOULDER  Strength:       Flexion:  Able to  "perform AAROM to 131 degrees  Abduction    Able to perform AAROM to 85 degrees    ELBOW   ROM:         L           Flexion                         WFL        Extension                     0                         ELBOW   MMT:         L        Flexion                           5/5                  Extension                       5/5                                 PALPATION:   Tightness and tenderness noted in L UT, L cervical and L shoulder including scapular muscles     TREATMENT:  Manual Therapy:    STM to L UT and mid trap muscles  Shoulder gliding posterior.          Therapeutic Exercise:    See objective measures    UBE   forward 2 minutes   backward 2 minutes    Pulleys   flexion  3 minutes     Finger ladder   Flexion  to  31 x5    Wand in standing    ER   Abduction   Chest press x10   Flexion to 90 degrees x10   Horizontal abd/add  x10     in supine   Flexion (full ROM)  x10 with clasped hands   Ceiling press  x15 - DNP    ===========DNP============================    Elbow   Flexion 2# x20    Extension  GTB (1 thickness)  x10B shoulder retraction.    *Strengthening exercises for the none surgical (R) UE with GTB x10 each   IR   ER   Row   Bicep curl   Tricep curl    Shoulder ER/horizontal abduction stretch on wall - HEP/DNP  Shoulder IR stretch at counter  - HEP/DNP  ==================================================    Home exercise program: (HEP)  Issued \"**\" exercise as new HEP with written copy issued.   \"*\" exercises are current HEP including:   *Pendulum exercise - DNP    *Standing with UE on table and weight shifting     Lateral   Forward and back    *Table glide in standing    Flexion   Circles    *Ball rolling (redl) on table   CW, CCW, forward and back    Table glides in sitting   *Flexion x10  to 92 degrees   *Abduction x10 to 95  *ER x10    Shoulder IR stretch at counter    *Strengthening exercises for the none surgical (R) UE with GTB x10 each   IR   ER   Row   Bicep curl   Tricep " curl    Education/instruction:  New:    + Discussed progress  and progression once strengthening is allowed a 3 months post op.      Prior:  Pt educated in:  + current status, general goals, treatment plan  + use of body on arm for hygiene  + Expectations for therapy  + Benefits of self active assistive and passive range of motion  + Added ball rolling to HEP  +Weaning from sling  +Able to remove sling with arm supported, especially if fingers are getting  +Realistic Progression  +Expected time frame for therapy  +Precautions  +New HEP  +progress and nature of progression  +use the sling as needed to reduced pain and offer support.    +may use heat or ice  + edema control  +Discussed new exercise/stretches per doctor  +Discussed progress  +Pt was educated on the purpose and use of kinesio tape including precautions, and safe removal directions.  +added exercise to R UE due to reported weakness  + R UE exercises are optional;    PAIN  End of session pain rating:  better    ASSESSMENT:   The pt is progressing well a significant increase in range of motion in all directions.  She is using the arm more with functional activities but avoiding lifting due to restrictions.  Treatment will increase strengthening as soon as patient is 3 months post and cleared by the doctor.  Pain is decreasing nicely however she has recently had 1 extremely painful day.    Compliance with HEP:     good  Assessment of current progress against goals:    progressing      GOALS:  Pt stated goal:  Full use of the left upper extremity     Short term goals:  + Increase left shoulder ROM by 20 degrees (MET)  + Reduce pain by 2 points with movement (MET)  + Independent in a basic HEP per protocol (MET)  + Able to sleep at least 6 hours a night (MET)     Long term goals:  + reduce pain to <3/10 (PROGRESSING)  + Full AROM in left shoulder as needed for reaching over head(PROGRESSING)  + 5/5 strength in left shoulder and scapular stabilizers for  stability during functional reach and lifting  + Improved posture and independent postural correction for reduced pain and proper biomechanics for optimal shoulder function(PROGRESSING)  + Independent in a HEP for self-management of symptoms.(PROGRESSING)  + Reduce soft tissue restrictions for normal biomechanics and function(PROGRESSING)  + Improve function with reduced reported disability by 10% on ASES (MET)  + Able to bathe and dress using the left UE without increased pain(PROGRESSING)  + Able to lift and reach without increased pain or compensation as needed for home and work(PROGRESSING)     New goal  + Improve function with reduced reported disability ASES to 25/30     PLAN  Progress strengthening exercise per protocol as tolerated    Skilled physical therapy 2 times per week for 4-6 weeks  Treatment may include:  Therapeutic Exercise (19028)  Therapeutic Activity (40361)  Manual therapy (60698)  Neuro-muscular re-education (50086)  Ultrasound (19112)  Unattended Electrical Stimulation (/70820)  Attended Electrical Stimulation (21544)  Light therapy (89230)

## 2025-05-01 ENCOUNTER — TREATMENT (OUTPATIENT)
Dept: PHYSICAL THERAPY | Facility: CLINIC | Age: 55
End: 2025-05-01
Payer: COMMERCIAL

## 2025-05-01 DIAGNOSIS — M75.112 INCOMPLETE TEAR OF LEFT ROTATOR CUFF, UNSPECIFIED WHETHER TRAUMATIC: ICD-10-CM

## 2025-05-01 PROCEDURE — 97140 MANUAL THERAPY 1/> REGIONS: CPT | Mod: GP | Performed by: PHYSICAL THERAPIST

## 2025-05-01 PROCEDURE — 97014 ELECTRIC STIMULATION THERAPY: CPT | Mod: GP | Performed by: PHYSICAL THERAPIST

## 2025-05-01 PROCEDURE — 97763 ORTHC/PROSTC MGMT SBSQ ENC: CPT | Mod: GP | Performed by: PHYSICAL THERAPIST

## 2025-05-01 NOTE — PROGRESS NOTES
Physical Therapy Treatment      Patient Name: Yadi Rocha  MRN: [id number]  Today's Date: 5/1/2025  Time Calculation  Start Time: 0915  Stop Time: 1000  Time Calculation (min): 45 min    PT Modalities Time Entry  E-Stim (Unattended) Time Entry: 10  PT Therapeutic Procedures Time Entry  Manual Therapy Time Entry: 10  Orthotic/Prosthetic Mgmt and/or Training (Subs Encounter) Time Entry: 22    INSURANCE:  Visit Number:20  RE-assessment on visit:  9 on 3/25/25  and on visit 19 on 4/209/25  Insurance Type: Payor: ADELA / Plan: [employer] TRADITIONAL / Product Type: *No Product type* /   CMS Re-Certification Period:2/17/25 to 5/18/25    Current Problem  1. Incomplete tear of left rotator cuff, unspecified whether traumatic  Follow Up In Physical Therapy          PRECAUTIONS:  work on range of motion  of the extremity. She will not begin any strengthening exercises until she is 3 months postoperative.      PMH: (pertinent to PT evaluation)   none  *See Epic EMR for complete list.    Medical History Form: Reviewed (scanned into chart)    PAIN:    Current:  1/10    SUBJECTIVE:  The pt is has mild pain, but is carefully doing more functionally     OBJECTIVE   ================4/29/25=====================  L SHOULDER:           AAROM                                                                       Flexion                          145  degrees                   Scaption  138  degrees    Extension                      50  degrees          Abduction                     135 degrees       ER at neutral                 50  degrees                             IR at neutral                 to sacrum            =============================================        TREATMENT:  Modalities:  Interferential electric stimulation (IFC) performed to L shoulder   at  Hz with sweep for 10 minutes to strong sensation  with cold pack    Manual Therapy:    STM to L anterior shoulder with stretching into abduction.      Therapeutic  "Exercise:    UBE   forward 2 minutes   backward 2 minutes    Pulleys   flexion  3 minutes     Finger ladder   Flexion  to  32 x5      Wand in standing    ER   Abduction   Chest press x10   Flexion above 90 degrees x10   Horizontal abd/add  x10    [place] TB     Rows   Long arm extension   ER  with towel   IR  with towel       in supine AROM   Flexion (full ROM)  x10    Ceiling press  x15 - DNP  ER/IR  Abduction to 90 degrees     Elbow   Flexion 2# x10    Extension  2#  x10      Home exercise program: (HEP)  Issued \"**\" exercise as new HEP with written copy issued.   \"*\" exercises are current HEP including:   *Pendulum exercise - DNP    *Standing with UE on table and weight shifting     Lateral   Forward and back    *Table glide in standing    Flexion   Circles    *Ball rolling (redl) on table   CW, CCW, forward and back    Table glides in sitting   *Flexion x10  to 92 degrees   *Abduction x10 to 95  *ER x10    Shoulder IR stretch at counter    *Strengthening exercises for the none surgical (R) UE with GTB x10 each   IR   ER   Row   Bicep curl   Tricep curl  ==========================================================  Education/instruction:  New:    + Discussed  progression.     Prior:  Pt educated in:  + current status, general goals, treatment plan  + use of body on arm for hygiene  + Expectations for therapy  + Benefits of self active assistive and passive range of motion  + Added ball rolling to HEP  +Weaning from sling  +Able to remove sling with arm supported, especially if fingers are getting  +Realistic Progression  +Expected time frame for therapy  +Precautions  +New HEP  +progress and nature of progression  +use the sling as needed to reduced pain and offer support.    +may use heat or ice  + edema control  +Discussed new exercise/stretches per doctor  +Discussed progress  +Pt was educated on the purpose and use of kinesio tape including precautions, and safe removal directions.  +added exercise to R UE due to " reported weakness  + R UE exercises are optional;    PAIN  End of session pain rating:  better    ASSESSMENT:   She was able to perform more functional activities at home including cutting in with painting.  Mild edema noted in the shoulder addressed with EMS and cold pack.   The pt is progressing well and able to progress to AROM form AAROM.  She was able to add lightest resistance to exercise.   Treatment will increase strengthening as soon as patient is 3 months post and cleared by the doctor.    Compliance with HEP:     good  Assessment of current progress against goals:    progressing      GOALS:  Pt stated goal:  Full use of the left upper extremity     Short term goals:  + Increase left shoulder ROM by 20 degrees (MET)  + Reduce pain by 2 points with movement (MET)  + Independent in a basic HEP per protocol (MET)  + Able to sleep at least 6 hours a night (MET)     Long term goals:  + reduce pain to <3/10 (PROGRESSING)  + Full AROM in left shoulder as needed for reaching over head(PROGRESSING)  + 5/5 strength in left shoulder and scapular stabilizers for stability during functional reach and lifting  + Improved posture and independent postural correction for reduced pain and proper biomechanics for optimal shoulder function(PROGRESSING)  + Independent in a HEP for self-management of symptoms.(PROGRESSING)  + Reduce soft tissue restrictions for normal biomechanics and function(PROGRESSING)  + Improve function with reduced reported disability by 10% on ASES (MET)  + Able to bathe and dress using the left UE without increased pain(PROGRESSING)  + Able to lift and reach without increased pain or compensation as needed for home and work(PROGRESSING)     New goal  + Improve function with reduced reported disability ASES to 25/30     PLAN  Progress strengthening exercise per protocol as tolerated    Skilled physical therapy 2 times per week for 4-6 weeks  Treatment may include:  Therapeutic Exercise  (18791)  Therapeutic Activity (39135)  Manual therapy (95397)  Neuro-muscular re-education (80950)  Ultrasound (65979)  Unattended Electrical Stimulation (/16078)  Attended Electrical Stimulation (09688)  Light therapy (91549)

## 2025-05-05 ENCOUNTER — TREATMENT (OUTPATIENT)
Dept: PHYSICAL THERAPY | Facility: CLINIC | Age: 55
End: 2025-05-05
Payer: COMMERCIAL

## 2025-05-05 DIAGNOSIS — M75.112 INCOMPLETE TEAR OF LEFT ROTATOR CUFF, UNSPECIFIED WHETHER TRAUMATIC: ICD-10-CM

## 2025-05-05 PROCEDURE — 97110 THERAPEUTIC EXERCISES: CPT | Mod: GP | Performed by: PHYSICAL THERAPIST

## 2025-05-05 PROCEDURE — 97140 MANUAL THERAPY 1/> REGIONS: CPT | Mod: GP | Performed by: PHYSICAL THERAPIST

## 2025-05-05 NOTE — PROGRESS NOTES
Physical Therapy Treatment      Patient Name: Yadi Rocha  MRN: [id number]  Today's Date: 5/5/2025  Time Calculation  Start Time: 1046  Stop Time: 1130  Time Calculation (min): 44 min       PT Therapeutic Procedures Time Entry  Manual Therapy Time Entry: 15  Therapeutic Exercise Time Entry: 26    INSURANCE:  Visit Number:21  RE-assessment on visit:  9 on 3/25/25  and on visit 19 on 4/209/25  Insurance Type: Payor: [employer] / Plan: [employer] TRADITIONAL / Product Type: *No Product type* /   CMS Re-Certification Period:2/17/25 to 5/18/25    Current Problem  1. Incomplete tear of left rotator cuff, unspecified whether traumatic  Follow Up In Physical Therapy          PRECAUTIONS:  work on range of motion  of the extremity. She will not begin any strengthening exercises until she is 3 months postoperative.      PMH: (pertinent to PT evaluation)   none  *See Epic EMR for complete list.    Medical History Form: Reviewed (scanned into chart)    PAIN:    Current:  1/10    SUBJECTIVE:  The pt is has strange spasm type feeling off and on the mid upper arm front and side.  This happens not during active.  - uncomfortable but not pain.  She only takes OTC meds at this point.      OBJECTIVE   ================4/29/25=====================  L SHOULDER:           AAROM                                                                       Flexion                          145  degrees                   Scaption  138  degrees    Extension                      50  degrees          Abduction                     135 degrees       ER at neutral                 50  degrees                             IR at neutral                 to sacrum            =============================================        TREATMENT:  Manual Therapy:    STM to L anterior shoulder and upper arm     Therapeutic Exercise:    UBE   forward 2 minutes   backward 2 minutes    Pulleys   flexion  3 minutes     Finger ladder   Flexion  to  32 x5   Abduction  to 32 to  "5   Wand in standing    ER x10   Abduction x10   Chest press x10   Flexion above 90 degrees x10   Horizontal abd/add  x10    [place]wen TB     Rows   Long arm extension   ER  with towel   IR  with towel    ==========================DNP==============================   in supine AROM   Flexion (full ROM)  x10    Ceiling press  x15 - DNP  ER/IR  Abduction to 90 degrees     Elbow   Flexion 2# x10    Extension  2#  x10  ==========================================================    Home exercise program: (HEP)  Issued \"**\" exercise as new HEP with written copy issued.   \"*\" exercises are current HEP including:   *Pendulum exercise - DNP    *Standing with UE on table and weight shifting     Lateral   Forward and back    *Table glide in standing    Flexion   Circles    *Ball rolling (redl) on table   CW, CCW, forward and back    Table glides in sitting   *Flexion x10  to 92 degrees   *Abduction x10 to 95  *ER x10    Shoulder IR stretch at counter    *Strengthening exercises for the none surgical (R) UE with GTB x10 each   IR   ER   Row   Bicep curl   Tricep curl  ==========================================================  Education/instruction:  New:    CONTINUE HEP    Prior:  Pt educated in:  + current status, general goals, treatment plan  + use of body on arm for hygiene  + Expectations for therapy  + Benefits of self active assistive and passive range of motion  + Added ball rolling to HEP  +Weaning from sling  +Able to remove sling with arm supported, especially if fingers are getting  +Realistic Progression  +Expected time frame for therapy  +Precautions  +New HEP  +progress and nature of progression  +use the sling as needed to reduced pain and offer support.    +may use heat or ice  + edema control  +Discussed new exercise/stretches per doctor  +Discussed progress  +Pt was educated on the purpose and use of kinesio tape including precautions, and safe removal directions.  +added exercise to R UE due to reported " weakness  + R UE exercises are optional;  + Discussed  progression.     PAIN  End of session pain rating:  better    ASSESSMENT:   The pt is having periodic light spasm inconsistently over the weekend.  These are not progressing to a full cramp, but are annoying and new.  She had good performance of exercise with no increase in pain.       Compliance with HEP:     good  Assessment of current progress against goals:    progressing      GOALS:  Pt stated goal:  Full use of the left upper extremity     Short term goals:  + Increase left shoulder ROM by 20 degrees (MET)  + Reduce pain by 2 points with movement (MET)  + Independent in a basic HEP per protocol (MET)  + Able to sleep at least 6 hours a night (MET)     Long term goals:  + reduce pain to <3/10 (PROGRESSING)  + Full AROM in left shoulder as needed for reaching over head(PROGRESSING)  + 5/5 strength in left shoulder and scapular stabilizers for stability during functional reach and lifting  + Improved posture and independent postural correction for reduced pain and proper biomechanics for optimal shoulder function(PROGRESSING)  + Independent in a HEP for self-management of symptoms.(PROGRESSING)  + Reduce soft tissue restrictions for normal biomechanics and function(PROGRESSING)  + Improve function with reduced reported disability by 10% on ASES (MET)  + Able to bathe and dress using the left UE without increased pain(PROGRESSING)  + Able to lift and reach without increased pain or compensation as needed for home and work(PROGRESSING)     New goal  + Improve function with reduced reported disability ASES to 25/30     PLAN  Progress strengthening exercise per protocol as tolerated    Skilled physical therapy 2 times per week for 4-6 weeks  Treatment may include:  Therapeutic Exercise (80508)  Therapeutic Activity (20245)  Manual therapy (45090)  Neuro-muscular re-education (42986)  Ultrasound (48759)  Unattended Electrical Stimulation (/79396)  Attended  Electrical Stimulation (57118)  Light therapy (45111)

## 2025-05-08 ENCOUNTER — APPOINTMENT (OUTPATIENT)
Dept: PRIMARY CARE | Facility: CLINIC | Age: 55
End: 2025-05-08
Payer: COMMERCIAL

## 2025-05-08 ENCOUNTER — TREATMENT (OUTPATIENT)
Dept: PHYSICAL THERAPY | Facility: CLINIC | Age: 55
End: 2025-05-08
Payer: COMMERCIAL

## 2025-05-08 VITALS
BODY MASS INDEX: 34.22 KG/M2 | HEART RATE: 77 BPM | DIASTOLIC BLOOD PRESSURE: 86 MMHG | HEIGHT: 68 IN | OXYGEN SATURATION: 99 % | WEIGHT: 225.8 LBS | SYSTOLIC BLOOD PRESSURE: 130 MMHG

## 2025-05-08 DIAGNOSIS — E66.01 MORBID OBESITY (MULTI): Primary | ICD-10-CM

## 2025-05-08 DIAGNOSIS — R03.0 ELEVATED BLOOD PRESSURE READING: ICD-10-CM

## 2025-05-08 DIAGNOSIS — M75.112 INCOMPLETE TEAR OF LEFT ROTATOR CUFF, UNSPECIFIED WHETHER TRAUMATIC: ICD-10-CM

## 2025-05-08 DIAGNOSIS — E66.811 OBESITY, CLASS I, BMI 30-34.9: ICD-10-CM

## 2025-05-08 PROCEDURE — 97035 APP MDLTY 1+ULTRASOUND EA 15: CPT | Mod: GP | Performed by: PHYSICAL THERAPIST

## 2025-05-08 PROCEDURE — 97110 THERAPEUTIC EXERCISES: CPT | Mod: GP | Performed by: PHYSICAL THERAPIST

## 2025-05-08 PROCEDURE — 3008F BODY MASS INDEX DOCD: CPT | Performed by: STUDENT IN AN ORGANIZED HEALTH CARE EDUCATION/TRAINING PROGRAM

## 2025-05-08 PROCEDURE — 97140 MANUAL THERAPY 1/> REGIONS: CPT | Mod: GP | Performed by: PHYSICAL THERAPIST

## 2025-05-08 PROCEDURE — 99214 OFFICE O/P EST MOD 30 MIN: CPT | Performed by: STUDENT IN AN ORGANIZED HEALTH CARE EDUCATION/TRAINING PROGRAM

## 2025-05-08 RX ORDER — PHENTERMINE HYDROCHLORIDE 37.5 MG/1
37.5 TABLET ORAL
Qty: 30 TABLET | Refills: 0 | Status: SHIPPED | OUTPATIENT
Start: 2025-05-08 | End: 2025-05-12

## 2025-05-08 NOTE — PROGRESS NOTES
"  Subjective   Patient ID:   Yadi Rocha is a  55 y.o. female who presents for Follow-up (PT IS HERE FOR A 1 MO FOLLOW UP. /70.).     HPI   Pt had to go off of her medications two weeks due to new job requirements.   Pt has noticed increased hunger when compared to the 37.5 mg dosing         Current Medications[1]    Visit Vitals  /86   Pulse 77   Ht 1.727 m (5' 8\")   Wt 102 kg (225 lb 12.8 oz)   SpO2 99%   BMI 34.33 kg/m²   OB Status Postmenopausal   Smoking Status Never   BSA 2.21 m²       Objective   Physical Exam  Vitals reviewed.   Constitutional:       Appearance: Normal appearance.   Cardiovascular:      Rate and Rhythm: Normal rate and regular rhythm.      Heart sounds: No murmur heard.  Pulmonary:      Effort: Pulmonary effort is normal. No respiratory distress.      Breath sounds: Normal breath sounds. No wheezing.   Musculoskeletal:      Cervical back: Neck supple.      Left lower leg: No edema.   Neurological:      Mental Status: She is alert.         Wt Readings from Last 10 Encounters:   05/08/25 102 kg (225 lb 12.8 oz)   04/08/25 102 kg (224 lb)   04/02/25 95.7 kg (211 lb)   02/20/25 102 kg (225 lb)   02/05/25 99.3 kg (219 lb)   01/23/25 99.6 kg (219 lb 9.3 oz)   01/09/25 100 kg (220 lb 14.4 oz)   01/08/25 99.7 kg (219 lb 12.8 oz)   12/11/24 98 kg (216 lb)   09/20/24 98.4 kg (217 lb)       Assessment/Plan   Diagnoses and all orders for this visit:  Morbid obesity (Multi)  Obesity, Class I, BMI 30-34.9  Elevated blood pressure reading      Left Rotator cuff tear  S/p 1/23/25 will be getting left rotator cuff repair  with Dr. Angela  S/p physical therapy   Pt has been cleared for walking      Obesity   BMI 34.33 kg/m2  one more month of phentermine back at the 37.5 mg daily   GLP-1 agonist is no longer covered by her insurance.   Exercise is not currently an option due to recent surgery  Medications: Wellbutrin (severe leg cramping)  Discussed Topamax and phentermine   Will plan " for Topamax addition on the   Physical exam was stable. Discussed maintaining diets such as DASH to help maintain normal  Phentermine was increased back to 37.5mg daily   Advised to take in the morning        Toshia Lawson DO 03/12/25 2:02 PM          [1]   Current Outpatient Medications:     cholecalciferol (Vitamin D3) 50 mcg (2,000 unit) capsule, Take by mouth., Disp: , Rfl:     cyanocobalamin, vitamin B-12, 1,000 mcg/mL drops, Take by mouth., Disp: , Rfl:     phentermine 30 mg capsule, Take 1 capsule (30 mg) by mouth once daily in the morning. Take before meals., Disp: 30 capsule, Rfl: 0    acetaminophen (Tylenol) 500 mg tablet, Take 2 tablets (1,000 mg) by mouth every 8 hours if needed for mild pain (1 - 3) (Take 2 tablets every 8 hours for 5 days, then after 5 days take 2 tablets as needed for pain). (Patient not taking: Reported on 5/8/2025), Disp: 90 tablet, Rfl: 0    gabapentin (Neurontin) 300 mg capsule, Take 1 capsule (300 mg) by mouth once daily at bedtime for 5 days., Disp: 5 capsule, Rfl: 0    oxyCODONE (Roxicodone) 5 mg immediate release tablet, Take 1 tablet (5 mg) by mouth every 6 hours if needed for severe pain (7 - 10) (Do not take within 3 hours of GABAPENTIN). (Patient not taking: Reported on 5/8/2025), Disp: 28 tablet, Rfl: 0

## 2025-05-08 NOTE — PROGRESS NOTES
Physical Therapy Treatment      Patient Name: Yadi Rocha  MRN: [id number]  Today's Date: 5/8/2025  Time Calculation  Start Time: 0915  Stop Time: 1002  Time Calculation (min): 47 min    PT Modalities Time Entry  Ultrasound Time Entry: 8  PT Therapeutic Procedures Time Entry  Manual Therapy Time Entry: 15  Therapeutic Exercise Time Entry: 20    INSURANCE:  Visit Number:21  RE-assessment on visit:  9 on 3/25/25  and on visit 19 on 4/209/25  Insurance Type: Payor: [employer] / Plan: [employer]M TRADITIONAL / Product Type: *No Product type* /   CMS Re-Certification Period:2/17/25 to 5/18/25    Current Problem  1. Incomplete tear of left rotator cuff, unspecified whether traumatic  Follow Up In Physical Therapy          PRECAUTIONS:  work on range of motion  of the extremity. She will not begin any strengthening exercises until she is 3 months postoperative.      PMH: (pertinent to PT evaluation)   none  *See Epic EMR for complete list.    Medical History Form: Reviewed (scanned into chart)    PAIN:    Current:  1/10    SUBJECTIVE:  The pt is has strange spasm type feeling off and on the mid upper arm front and side.  This happens not during active.  - uncomfortable but not pain.  She only takes OTC meds at this point.      OBJECTIVE   ================4/29/25=====================  L SHOULDER:           AAROM                                                                       Flexion                          145  degrees                   Scaption  138  degrees    Extension                      50  degrees          Abduction                     135 degrees       ER at neutral                 50  degrees                             IR at neutral                 to sacrum            =============================================        TREATMENT:  Manual Therapy:    STM to L UT, supraspinatus, mid trap, and levator   STM into L cervical paraspinals  Shoulder gliding into retraction     Modalities:   Ultrasound (US)  was  "performed to L shoudler at 1.5w/cm2 and 1MHz  at 100% for 8 minutes  Therapeutic Exercise:    UBE   forward 2 minutes   backward 2 minutes    Pulleys   flexion  3 minutes     Finger ladder   Flexion  to  32 x4   Abduction  to 32 to 4     Wand in standing    ER x10   Abduction x10   Chest press x10   Flexion above 90 degrees x10   Horizontal abd/add  x10    [place] TB     Rows   Long arm extension - DNP   ER     IR     Arm lift off  8 o'clock  x5  9 o'clock  x5  ==========================DNP==============================   in supine AROM   Flexion (full ROM)  x10    Ceiling press  x15 - DNP  ER/IR  Abduction to 90 degrees     Elbow   Flexion 2# x10    Extension  2#  x10  ==========================================================    Home exercise program: (HEP)  Issued \"**\" exercise as new HEP with written copy issued.   \"*\" exercises are current HEP including:   *Pendulum exercise - DNP    *Standing with UE on table and weight shifting     Lateral   Forward and back    *Table glide in standing    Flexion   Circles    *Ball rolling (redl) on table   CW, CCW, forward and back    Table glides in sitting   *Flexion x10  to 92 degrees   *Abduction x10 to 95  *ER x10    Shoulder IR stretch at counter    *Strengthening exercises for the none surgical (R) UE with GTB x10 each   IR   ER   Row   Bicep curl   Tricep curl    Arm lift off  8 o'clock  x5  9 o'clock  x5  ==========================================================  Education/instruction:  New:    Added arm lift off    Prior:  Pt educated in:  + current status, general goals, treatment plan  + use of body on arm for hygiene  + Expectations for therapy  + Benefits of self active assistive and passive range of motion  + Added ball rolling to HEP  +Weaning from sling  +Able to remove sling with arm supported, especially if fingers are getting  +Realistic Progression  +Expected time frame for therapy  +Precautions  +New HEP  +progress and nature of progression  +use the " sling as needed to reduced pain and offer support.    +may use heat or ice  + edema control  +Discussed new exercise/stretches per doctor  +Discussed progress  +Pt was educated on the purpose and use of kinesio tape including precautions, and safe removal directions.  +added exercise to R UE due to reported weakness  + R UE exercises are optional;  + Discussed  progression.     PAIN  End of session pain rating:  reduced    ASSESSMENT:   The pt is having overall soreness, but not enough to stop her from exercise or actvirty.  She has good insight into balancing return to function with shoulder precautions.  She has general tightness throughout the shoulder and cervical. This was addressed with manual and US.     Compliance with HEP:     good  Assessment of current progress against goals:    progressing      GOALS:  Pt stated goal:  Full use of the left upper extremity     Short term goals:  + Increase left shoulder ROM by 20 degrees (MET)  + Reduce pain by 2 points with movement (MET)  + Independent in a basic HEP per protocol (MET)  + Able to sleep at least 6 hours a night (MET)     Long term goals:  + reduce pain to <3/10 (PROGRESSING)  + Full AROM in left shoulder as needed for reaching over head(PROGRESSING)  + 5/5 strength in left shoulder and scapular stabilizers for stability during functional reach and lifting  + Improved posture and independent postural correction for reduced pain and proper biomechanics for optimal shoulder function(PROGRESSING)  + Independent in a HEP for self-management of symptoms.(PROGRESSING)  + Reduce soft tissue restrictions for normal biomechanics and function(PROGRESSING)  + Improve function with reduced reported disability by 10% on ASES (MET)  + Able to bathe and dress using the left UE without increased pain(PROGRESSING)  + Able to lift and reach without increased pain or compensation as needed for home and work(PROGRESSING)     New goal  + Improve function with reduced reported  disability ASES to 25/30     PLAN  Progress strengthening exercise per protocol as tolerated    Skilled physical therapy 2 times per week for 4-6 weeks  Treatment may include:  Therapeutic Exercise (16368)  Therapeutic Activity (56680)  Manual therapy (76625)  Neuro-muscular re-education (46177)  Ultrasound (26560)  Unattended Electrical Stimulation (/42649)  Attended Electrical Stimulation (37002)  Light therapy (79362)

## 2025-05-12 RX ORDER — PHENTERMINE HYDROCHLORIDE 37.5 MG/1
37.5 TABLET ORAL
Qty: 30 TABLET | Refills: 0 | Status: SHIPPED | OUTPATIENT
Start: 2025-05-12

## 2025-05-13 ENCOUNTER — TREATMENT (OUTPATIENT)
Dept: PHYSICAL THERAPY | Facility: CLINIC | Age: 55
End: 2025-05-13
Payer: COMMERCIAL

## 2025-05-13 DIAGNOSIS — M75.112 INCOMPLETE TEAR OF LEFT ROTATOR CUFF, UNSPECIFIED WHETHER TRAUMATIC: ICD-10-CM

## 2025-05-13 PROCEDURE — 97110 THERAPEUTIC EXERCISES: CPT | Mod: GP | Performed by: PHYSICAL THERAPIST

## 2025-05-13 NOTE — PROGRESS NOTES
Physical Therapy Treatment  and re-assessment    Patient Name: Yadi Rocha  MRN: [id number]  Today's Date: 5/13/2025  Time Calculation  Start Time: 1055  Stop Time: 1130  Time Calculation (min): 35 min       PT Therapeutic Procedures Time Entry  Manual Therapy Time Entry: 10  Therapeutic Exercise Time Entry: 25    INSURANCE:  Visit Number:22  RE-assessment on visit:  9 on 3/25/25  and on visit 19 on 4/209/25  Insurance Type: Payor: ANTHEM / Plan: ANTHEM TRADITIONAL / Product Type: *No Product type* /   CMS Re-Certification Period:2/17/25 to 5/18/25    Current Problem  1. Incomplete tear of left rotator cuff, unspecified whether traumatic  Follow Up In Physical Therapy          PRECAUTIONS:  work on range of motion  of the extremity. She will not begin any strengthening exercises until she is 3 months postoperative.      PMH: (pertinent to PT evaluation)   none  *See Epic EMR for complete list.    Medical History Form: Reviewed (scanned into chart)    PAIN:    Current:  0/10  Range  0/10 to 8/10 with IR  Better with neutral position.    Worse with IR    SUBJECTIVE:  The pt less pain, but generalized tightness in the L shoudler.  It is strong pain. Stiff with pulling back and with IR.  Some motions have pain in the incission.      OBJECTIVE  L SHOULDER:           AAROM                                                                       Flexion                           degrees                   Scaption  150  degrees    Extension                      50  degrees          Abduction                     150 degrees       ER at neutral                 50  degrees                             IR at neutral                 to L1            POSTURE:  Mild Protraction of L shoulder       SHOULDER  Strength:       Flexion:  Able to perform AROM to 135 degrees  Abduction    Able to perform AROM to 121 degrees   mid trap  3/5  Low trap   2+/5    ELBOW   ROM:         L           Flexion                         WFL       "  Extension                     0                          ELBOW   MMT:         L        Flexion                           5/5                  Extension                       5/5                                 PALPATION:   Tightness and tenderness noted in L lateral arm and into biceps  Tightness in L UT, L cervical and L shoulder including scapular muscles        TREATMENT:  Manual Therapy:    STM to L UT, supraspinatus, mid trap, and levator   Shoulder gliding into retraction       Therapeutic Exercise:    See objective measures     UBE (self warm up)   forward 2 minutes   backward 2 minutes    Finger ladder   Flexion  to  32 x4   Abduction  to 32 to 4     Wand in standing    ER x10   Abduction x10 - DNP   Chest press x10   Flexion above 90 degrees x10   Horizontal abd/add  x10    [place] TB     Rows   Long arm extension    ER     IR     Arm lift off  8 o'clock  x5  9 o'clock  x5  ==========================DNP==============================   in supine AROM   Flexion (full ROM)  x10    Ceiling press  x15 - DNP  ER/IR  Abduction to 90 degrees     Elbow   Flexion 2# x10    Extension  2#  x10  ==========================================================    Home exercise program: (HEP)  Issued \"**\" exercise as new HEP with written copy issued.   \"*\" exercises are current HEP including:   *Pendulum exercise - DNP    *Standing with UE on table and weight shifting     Lateral   Forward and back    *Table glide in standing    Flexion   Circles    *Ball rolling (redl) on table   CW, CCW, forward and back    Table glides in sitting   *Flexion x10  to 92 degrees   *Abduction x10 to 95  *ER x10    Shoulder IR stretch at counter    *Strengthening exercises for the none surgical (R) UE with GTB x10 each   IR   ER   Row   Bicep curl   Tricep curl    Arm lift off  8 o'clock  x5  9 o'clock  x5  ==========================================================      Education/instruction:  New:    Discussed current status and progress " "    Prior:  Pt educated in:  + current status, general goals, treatment plan  + use of body on arm for hygiene  + Expectations for therapy  + Benefits of self active assistive and passive range of motion  + Added ball rolling to HEP  +Weaning from sling  +Able to remove sling with arm supported, especially if fingers are getting  +Realistic Progression  +Expected time frame for therapy  +Precautions  +New HEP  +progress and nature of progression  +use the sling as needed to reduced pain and offer support.    +may use heat or ice  + edema control  +Discussed new exercise/stretches per doctor  +Discussed progress  +Pt was educated on the purpose and use of kinesio tape including precautions, and safe removal directions.  +added exercise to R UE due to reported weakness  + R UE exercises are optional;  + Discussed  progression.   Added arm lift off    PAIN  End of session pain rating:  \"not bad\"    ASSESSMENT:   The pt has excellent progress in the last 2 weeks since last re-assessment.  AROM is greatly improved in flexion, IR and abduction.  Her greatest tightness  is in IR . She has general tightness throughout the shoulder and cervical of varying degrees.   She has good insight into balancing return to function with shoulder precautions.  Therapy is awaiting the 3 month rissa and doctor approval to increase into greater strengthening phase of therapy.  No modalities were needed today.      Compliance with HEP:     good  Assessment of current progress against goals:    progressing      GOALS:  Pt stated goal:  Full use of the left upper extremity     Short term goals:  + Increase left shoulder ROM by 20 degrees (MET)  + Reduce pain by 2 points with movement (MET)  + Independent in a basic HEP per protocol (MET)  + Able to sleep at least 6 hours a night (MET)     Long term goals:  + reduce pain to <3/10 (PROGRESSING)  + Full AROM in left shoulder as needed for reaching over head(PROGRESSING)  + 5/5 strength in left " shoulder and scapular stabilizers for stability during functional reach and lifting  + Improved posture and independent postural correction for reduced pain and proper biomechanics for optimal shoulder function(PROGRESSING)  + Independent in a [facility] for self-management of symptoms.(PROGRESSING)  + Reduce soft tissue restrictions for normal biomechanics and function(PROGRESSING)  + Improve function with reduced reported disability by 10% on ASES (MET)  + Able to bathe and dress using the left UE without increased pain(PROGRESSING)  + Able to lift and reach without increased pain or compensation as needed for home and work(PROGRESSING)     New goal  + Improve function with reduced reported disability ASES to 25/30     PLAN  Progress strengthening exercise per protocol as tolerated    Skilled physical therapy 2 times per week for 4-6 weeks  Treatment may include:  Therapeutic Exercise (81878)  Therapeutic Activity (11570)  Manual therapy (48798)  Neuro-muscular re-education (00649)  Ultrasound (45175)  Unattended Electrical Stimulation (/20031)  Attended Electrical Stimulation (05551)  Light therapy (37281)

## 2025-05-14 ENCOUNTER — APPOINTMENT (OUTPATIENT)
Dept: ORTHOPEDIC SURGERY | Facility: CLINIC | Age: 55
End: 2025-05-14
Payer: COMMERCIAL

## 2025-05-14 VITALS — WEIGHT: 219 LBS | BODY MASS INDEX: 32.44 KG/M2 | HEIGHT: 69 IN

## 2025-05-14 DIAGNOSIS — Z98.890 STATUS POST LEFT ROTATOR CUFF REPAIR: ICD-10-CM

## 2025-05-14 DIAGNOSIS — M75.112 INCOMPLETE TEAR OF LEFT ROTATOR CUFF, UNSPECIFIED WHETHER TRAUMATIC: ICD-10-CM

## 2025-05-14 PROCEDURE — 1036F TOBACCO NON-USER: CPT

## 2025-05-14 PROCEDURE — 3008F BODY MASS INDEX DOCD: CPT

## 2025-05-14 PROCEDURE — 99213 OFFICE O/P EST LOW 20 MIN: CPT

## 2025-05-14 ASSESSMENT — PAIN SCALES - GENERAL: PAINLEVEL_OUTOF10: 0 - NO PAIN

## 2025-05-14 ASSESSMENT — PAIN - FUNCTIONAL ASSESSMENT: PAIN_FUNCTIONAL_ASSESSMENT: 0-10

## 2025-05-14 NOTE — PROGRESS NOTES
History of Present Illness          Chief Complaint   Patient presents with    Left Shoulder - Post-op       1/23/25 LT SHOULDER SCOPE, RTC REPAIR, BICEPS TENODESIS         54 y.o. female is 3 1/2 months status post left shoulder scope with rotator cuff repair and bicep tenodesis.  Patient states that her pain is tolerable at this point.  She notes is more like a soreness.  She occasionally gets some sharp burning pain but it goes away quickly on its own.  She states that physical therapy has been going well.  She notes that her motion continues to progress.  She does state that it feels little stiff in the morning but gets better throughout the day.  She states that they had just recently started in her brace and strengthening exercises and therapy.  She is not taking anything for pain.  She continues to ice as needed.  She has no concerns with her incisions.     Review of Systems   GENERAL: Negative for malaise, significant weight loss, fever, chills  MUSCULOSKELETAL: see HPI  NEURO:  Negative     Exam  Left shoulder demonstrates well-healed portal site incisions.  There is no tenderness palpation over the sternoclavicular joint or AC joint today.  Range of motion of the left shoulder is 125 degrees forward flexion abduction, 60 degrees of external rotation and internally rotates to the L3.  Negative Neer's and Carreon.  No pain with Jobes testing with mild weakness.   strength is symmetric bilaterally failure radial pulses 2+ bilaterally.  SILT. UE is NVI.     Assessment  Patient is 3 and half months status post left shoulder scope with rotator cuff repair, biceps tenodesis.  Patient is doing satisfactory from postoperative standpoint at this point     Plan   This manage with patient today.  At this time discussed with patient that she should continue with physical therapy to progress her strengthening.  She was given a new prescription for physical therapy today so they incorporate strengthening exercises.   She should continue to stretch daily.  We discussed that that was not the stiffness that she is feeling.  She can gradually increase her activities as tolerated.  As long as patient continues to progress satisfactorily, we can follow-up with her as needed regarding this issue.

## 2025-05-14 NOTE — PATIENT INSTRUCTIONS
BMI was above normal measurement. Current weight: 99.3 kg (219 lb)  Weight change since last visit (-) denotes wt loss -6.8 lbs   Weight loss needed to achieve BMI 25: 52.5 Lbs  Weight loss needed to achieve BMI 30: 19.2 Lbs  Advised to Increase physical activity.

## 2025-05-15 ENCOUNTER — TREATMENT (OUTPATIENT)
Dept: PHYSICAL THERAPY | Facility: CLINIC | Age: 55
End: 2025-05-15
Payer: COMMERCIAL

## 2025-05-15 DIAGNOSIS — M75.112 INCOMPLETE TEAR OF LEFT ROTATOR CUFF, UNSPECIFIED WHETHER TRAUMATIC: ICD-10-CM

## 2025-05-15 PROCEDURE — 97140 MANUAL THERAPY 1/> REGIONS: CPT | Mod: GP | Performed by: PHYSICAL THERAPIST

## 2025-05-15 PROCEDURE — 97110 THERAPEUTIC EXERCISES: CPT | Mod: GP | Performed by: PHYSICAL THERAPIST

## 2025-05-15 NOTE — PROGRESS NOTES
Physical Therapy Treatment  and re-assessment    Patient Name: Yadi Rocha  MRN: [id number]  Today's Date: 5/15/2025  Time Calculation  Start Time: 0915  Stop Time: 1000  Time Calculation (min): 45 min       PT Therapeutic Procedures Time Entry  Manual Therapy Time Entry: 15  Therapeutic Exercise Time Entry: 30    INSURANCE:  Visit Number:23  RE-assessment on visit:  9 on 3/25/25  and on visit 19 on 4/209/25  Insurance Type: Payor: ANTHEM / Plan: ANTHEM TRADITIONAL / Product Type: *No Product type* /   CMS Re-Certification Period:2/17/25 to 5/18/25    Current Problem  1. Incomplete tear of left rotator cuff, unspecified whether traumatic  Follow Up In Physical Therapy          PRECAUTIONS:  work on range of motion  of the extremity. She will not begin any strengthening exercises until she is 3 months postoperative.      PMH: (pertinent to PT evaluation)   none  *See Epic EMR for complete list.    Medical History Form: Reviewed (scanned into chart)    PAIN:    Current:  0/10  Range  0/10 to 8/10 with IR  Better with neutral position.    Worse with IR    SUBJECTIVE:  The pt saw the PA  and is released to start strengthening firsts in PT and then on own at home.  She was told she is having both muscle spasms and nerve irritation from swelling.      OBJECTIVE  TREATMENT:  Manual Therapy:    STM to anterior shoulder during passive range of motion into flexion and abduction  STM to L UT, supraspinatus, mid trap, and levator - DNP  Shoulder gliding into retraction -DNP      Therapeutic Exercise:    See objective measures     UBE (self warm up)   forward 2 minutes   backward 2 minutes    Finger ladder   Flexion  to  32 x3   Abduction  to 32 x 3      3# Wand in standing    ER x10   Chest press x10   Flexion above 90 degrees x10   Horizontal abd/add  x10    Cable Cross   Lat pull with tricep extension 3#  1x10  Rows 3#  1x10   Long arm extension 3#  1x10   ER  3#  1x10 each    IR 3#  1x10    Bicep curl  3#  1x10  "    Arm lift off  8 o'clock  x5 - DNP  9 o'clock  x5 - DNP    Wall slide    Abduction x10    Prone    **I shoulder extension B x10   **T mid trap  L x10   **Y low trap   B x10      Home exercise program: (HEP)  Issued \"**\" exercise as new HEP with written copy issued.   \"*\" exercises are current HEP including:   Prone    **I shoulder extension B x10   **T mid trap  L x10   **Y low trap   B x10    Standing GTB  Rows    ER     IR       *Pendulum exercise - DNP    *Standing with UE on table and weight shifting     Lateral   Forward and back    *Table glide in standing    Flexion   Circles    *Ball rolling (redl) on table   CW, CCW, forward and back    Table glides in sitting   *Flexion x10  to 92 degrees   *Abduction x10 to 95  *ER x10    Shoulder IR stretch at counter    *Strengthening exercises for the none surgical (R) UE with GTB x10 each   IR   ER   Row   Bicep curl   Tricep curl    Arm lift off  8 o'clock  x5  9 o'clock  x5  ==========================================================      Education/instruction:  New:    Added above home exercises and issued green Thera-Band  Prior:  Pt educated in:  + current status, general goals, treatment plan  + use of body on arm for hygiene  + Expectations for therapy  + Benefits of self active assistive and passive range of motion  + Added ball rolling to HEP  +Weaning from sling  +Able to remove sling with arm supported, especially if fingers are getting  +Realistic Progression  +Expected time frame for therapy  +Precautions  +New HEP  +progress and nature of progression  +use the sling as needed to reduced pain and offer support.    +may use heat or ice  + edema control  +Discussed new exercise/stretches per doctor  +Discussed progress  +Pt was educated on the purpose and use of kinesio tape including precautions, and safe removal directions.  +added exercise to R UE due to reported weakness  + R UE exercises are optional;  + Discussed  progression.   Added arm lift " "off    PAIN  End of session pain rating:  \"not bad\"    ASSESSMENT:   The pt has excellent progress And now with clearance with the doctor he has performed exercise with 3 pounds on cable cross and progressed to green Thera-Band for home exercises.  Scapular stability strengthening was added with ice, teas, and Y's.  She has difficulty getting into the Y position but otherwise performs exercises without difficulty.    Compliance with HEP:     good  Assessment of current progress against goals:    progressing      GOALS:  Pt stated goal:  Full use of the left upper extremity     Short term goals:  + Increase left shoulder ROM by 20 degrees (MET)  + Reduce pain by 2 points with movement (MET)  + Independent in a basic HEP per protocol (MET)  + Able to sleep at least 6 hours a night (MET)     Long term goals:  + reduce pain to <3/10 (PROGRESSING)  + Full AROM in left shoulder as needed for reaching over head(PROGRESSING)  + 5/5 strength in left shoulder and scapular stabilizers for stability during functional reach and lifting  + Improved posture and independent postural correction for reduced pain and proper biomechanics for optimal shoulder function(PROGRESSING)  + Independent in a HEP for self-management of symptoms.(PROGRESSING)  + Reduce soft tissue restrictions for normal biomechanics and function(PROGRESSING)  + Improve function with reduced reported disability by 10% on ASES (MET)  + Able to bathe and dress using the left UE without increased pain(PROGRESSING)  + Able to lift and reach without increased pain or compensation as needed for home and work(PROGRESSING)     New goal  + Improve function with reduced reported disability ASES to 25/30     PLAN  Progress strengthening exercise per protocol as tolerated    Skilled physical therapy 2 times per week for 4-6 weeks  Treatment may include:  Therapeutic Exercise (49368)  Therapeutic Activity (52875)  Manual therapy (28500)  Neuro-muscular re-education " (42875)  Ultrasound (43662)  Unattended Electrical Stimulation (/98961)  Attended Electrical Stimulation (26639)  Light therapy (87941)

## 2025-05-20 ENCOUNTER — APPOINTMENT (OUTPATIENT)
Dept: PHYSICAL THERAPY | Facility: CLINIC | Age: 55
End: 2025-05-20
Payer: COMMERCIAL

## 2025-05-22 ENCOUNTER — TREATMENT (OUTPATIENT)
Dept: PHYSICAL THERAPY | Facility: CLINIC | Age: 55
End: 2025-05-22
Payer: COMMERCIAL

## 2025-05-22 DIAGNOSIS — M75.112 INCOMPLETE TEAR OF LEFT ROTATOR CUFF, UNSPECIFIED WHETHER TRAUMATIC: ICD-10-CM

## 2025-05-22 PROCEDURE — 97110 THERAPEUTIC EXERCISES: CPT | Mod: GP | Performed by: PHYSICAL THERAPIST

## 2025-05-22 PROCEDURE — 97140 MANUAL THERAPY 1/> REGIONS: CPT | Mod: GP | Performed by: PHYSICAL THERAPIST

## 2025-05-22 NOTE — PROGRESS NOTES
Physical Therapy Treatment   Patient Name: Yadi Rocha  MRN: [id number]  Today's Date: 5/22/2025  Time Calculation  Start Time: 0917  Stop Time: 1000  Time Calculation (min): 43 min       PT Therapeutic Procedures Time Entry  Manual Therapy Time Entry: 10  Therapeutic Exercise Time Entry: 30    INSURANCE:  Visit Number:24  RE-assessment on visit:  9 on 3/25/25  and on visit 19 on 4/209/25  Insurance Type: Payor: ANTHEM / Plan: ANTHEM TRADITIONAL / Product Type: *No Product type* /   CMS Re-Certification Period:2/17/25 to 5/18/25    Current Problem  1. Incomplete tear of left rotator cuff, unspecified whether traumatic  Follow Up In Physical Therapy          PRECAUTIONS:  work on range of motion  of the extremity. She will not begin any strengthening exercises until she is 3 months postoperative.      PMH: (pertinent to PT evaluation)   none  *See Epic EMR for complete list.    Medical History Form: Reviewed (scanned into chart)    PAIN:    Current:  2/10       SUBJECTIVE:  The pt has soreness in her neck.  She did zomba and light weight resistance class. The L arm fatigued more quickly. She was even able to do motions she couldn't do before surgery.   IR is still hard    OBJECTIVE  TREATMENT:  Manual Therapy:    STM to L UT, supraspinatus, mid trap, and levator   Shoulder gliding into retraction -DNP      Therapeutic Exercise:      UBE    forward 2 minutes   backward 2 minutes    Finger ladder   Flexion  to  32 x4   Abduction  to 32 x 4      7# Wand in standing    ER x10   Chest press x10   Flexion above 90 degrees x10   Horizontal abd/add  x10  Bicep curl  3#  1x10     Cable Cross   Lat pull with tricep extension 7#  1x10  Rows 7#  1x10   Long arm extension 7#  1x10   ER  3#  1x10    IR 3#  1x10      Arm lift off  9 o'clock  x10  10 o'clock  x10  11 o'clock x10    Wall slide    Abduction x10    Prone    *I shoulder extension B x10 - DNP   *T mid trap  L x10 - DNP   *Y low trap   B x10- DNP      Home  "exercise program: (HEP)  Issued \"**\" exercise as new HEP with written copy issued.   \"*\" exercises are current HEP including:   Prone    **I shoulder extension B x10   **T mid trap  L x10   **Y low trap   B x10    Standing GTB  Rows    ER     IR       *Pendulum exercise - DNP    *Standing with UE on table and weight shifting     Lateral   Forward and back    *Table glide in standing    Flexion   Circles    *Ball rolling (redl) on table   CW, CCW, forward and back    Table glides in sitting   *Flexion x10  to 92 degrees   *Abduction x10 to 95  *ER x10    Shoulder IR stretch at counter    *Strengthening exercises for the none surgical (R) UE with GTB x10 each   IR   ER   Row   Bicep curl   Tricep curl    Arm lift off  8 o'clock  x5  9 o'clock  x5  ==========================================================      Education/instruction:  New:    Discussed progress and plan  Prior:  Pt educated in:  + current status, general goals, treatment plan  + use of body on arm for hygiene  + Expectations for therapy  + Benefits of self active assistive and passive range of motion  + Added ball rolling to HEP  +Weaning from sling  +Able to remove sling with arm supported, especially if fingers are getting  +Realistic Progression  +Expected time frame for therapy  +Precautions  +New HEP  +progress and nature of progression  +use the sling as needed to reduced pain and offer support.    +may use heat or ice  + edema control  +Discussed new exercise/stretches per doctor  +Discussed progress  +Added above home exercises and issued green Thera-Band  +Pt was educated on the purpose and use of kinesio tape including precautions, and safe removal directions.  +added exercise to R UE due to reported weakness  + R UE exercises are optional;  + Discussed  progression.   Added arm lift off    PAIN  End of session pain rating:  no complaints    ASSESSMENT:   The pt has excellent progress with all exercises and was able to increase resistance " from 3 pounds to 7 pounds with both wand exercises and most cable cross exercises.  She has significantly greater ease  getting into the Y position with standing while left off exercises.  Strength with this (low trap) still challenging.  She is challenged with intermittent torsion range of motion.  She does have tightness in the upper trap which still fires prematurely to help stabilize the shoulder.    Compliance with HEP:     good  Assessment of current progress against goals:    progressing well      GOALS:  Pt stated goal:  Full use of the left upper extremity     Short term goals:  + Increase left shoulder ROM by 20 degrees (MET)  + Reduce pain by 2 points with movement (MET)  + Independent in a basic HEP per protocol (MET)  + Able to sleep at least 6 hours a night (MET)     Long term goals:  + reduce pain to <3/10 (PROGRESSING)  + Full AROM in left shoulder as needed for reaching over head(PROGRESSING)  + 5/5 strength in left shoulder and scapular stabilizers for stability during functional reach and lifting  + Improved posture and independent postural correction for reduced pain and proper biomechanics for optimal shoulder function(PROGRESSING)  + Independent in a HEP for self-management of symptoms.(PROGRESSING)  + Reduce soft tissue restrictions for normal biomechanics and function(PROGRESSING)  + Improve function with reduced reported disability by 10% on ASES (MET)  + Able to bathe and dress using the left UE without increased pain(PROGRESSING)  + Able to lift and reach without increased pain or compensation as needed for home and work(PROGRESSING)     New goal  + Improve function with reduced reported disability ASES to 25/30     PLAN  Reduce frequency of treatment to 1 time a week.  Progress strengthening and return to function    Skilled physical therapy 1 times per week for 4-6 weeks  Treatment may include:  Therapeutic Exercise (24920)  Therapeutic Activity (16853)  Manual therapy  (39795)  Neuro-muscular re-education (47565)  Ultrasound (83602)  Unattended Electrical Stimulation (/64537)  Attended Electrical Stimulation (96636)  Light therapy (69669)

## 2025-06-06 ENCOUNTER — DOCUMENTATION (OUTPATIENT)
Dept: PHYSICAL THERAPY | Facility: CLINIC | Age: 55
End: 2025-06-06
Payer: COMMERCIAL

## 2025-06-06 NOTE — PROGRESS NOTES
Physical Therapy                 Therapy Communication Note  Patient Name: Yadi Rocha  MRN: [id number]  Date: 6/6/2025       Discipline: Physical Therapy    Missed Time: No Show

## 2025-06-09 ENCOUNTER — APPOINTMENT (OUTPATIENT)
Dept: PRIMARY CARE | Facility: CLINIC | Age: 55
End: 2025-06-09
Payer: COMMERCIAL

## 2025-06-09 VITALS
HEIGHT: 68 IN | WEIGHT: 226.4 LBS | OXYGEN SATURATION: 98 % | BODY MASS INDEX: 34.31 KG/M2 | SYSTOLIC BLOOD PRESSURE: 130 MMHG | DIASTOLIC BLOOD PRESSURE: 82 MMHG | HEART RATE: 67 BPM

## 2025-06-09 DIAGNOSIS — E66.811 OBESITY, CLASS I, BMI 30-34.9: Primary | ICD-10-CM

## 2025-06-09 DIAGNOSIS — E66.01 MORBID OBESITY (MULTI): ICD-10-CM

## 2025-06-09 PROCEDURE — 3008F BODY MASS INDEX DOCD: CPT | Performed by: STUDENT IN AN ORGANIZED HEALTH CARE EDUCATION/TRAINING PROGRAM

## 2025-06-09 PROCEDURE — 1036F TOBACCO NON-USER: CPT | Performed by: STUDENT IN AN ORGANIZED HEALTH CARE EDUCATION/TRAINING PROGRAM

## 2025-06-09 PROCEDURE — 99214 OFFICE O/P EST MOD 30 MIN: CPT | Performed by: STUDENT IN AN ORGANIZED HEALTH CARE EDUCATION/TRAINING PROGRAM

## 2025-06-09 RX ORDER — PHENTERMINE HYDROCHLORIDE 37.5 MG/1
37.5 TABLET ORAL
Qty: 30 TABLET | Refills: 0 | Status: SHIPPED | OUTPATIENT
Start: 2025-06-09

## 2025-06-09 RX ORDER — TOPIRAMATE 25 MG/1
TABLET, FILM COATED ORAL
Qty: 60 TABLET | Refills: 5 | Status: SHIPPED | OUTPATIENT
Start: 2025-06-09

## 2025-06-09 NOTE — PROGRESS NOTES
"  Subjective   Patient ID:   Yadi Rocha is a  55 y.o. female who presents for Weight Check (Pt is here for a 1 mo weight check.).     HPI   Pt states she has not noticed much of a change with her weight since starting back her walking.         Current Medications[1]    Visit Vitals  /82   Pulse 67   Ht 1.727 m (5' 8\")   Wt 103 kg (226 lb 6.4 oz)   SpO2 98%   BMI 34.42 kg/m²   OB Status Postmenopausal   Smoking Status Never   BSA 2.22 m²     Wt Readings from Last 10 Encounters:   06/09/25 103 kg (226 lb 6.4 oz)   05/14/25 99.3 kg (219 lb)   05/08/25 102 kg (225 lb 12.8 oz)   04/08/25 102 kg (224 lb)   04/02/25 95.7 kg (211 lb)   02/20/25 102 kg (225 lb)   02/05/25 99.3 kg (219 lb)   01/23/25 99.6 kg (219 lb 9.3 oz)   01/09/25 100 kg (220 lb 14.4 oz)   01/08/25 99.7 kg (219 lb 12.8 oz)       Objective   Physical Exam  Constitutional:       Appearance: Normal appearance.   Cardiovascular:      Rate and Rhythm: Normal rate and regular rhythm.      Heart sounds: No murmur heard.  Pulmonary:      Effort: Pulmonary effort is normal. No respiratory distress.      Breath sounds: Normal breath sounds. No wheezing.   Musculoskeletal:      Cervical back: Neck supple.      Left lower leg: No edema.   Neurological:      Mental Status: She is alert.         Assessment/Plan   Diagnoses and all orders for this visit:  Obesity, Class I, BMI 30-34.9  -     topiramate (Topamax) 25 mg tablet; Take 1 tablet daily x 7 days then increase to 1 tablet twice daily  Morbid obesity (Multi)  -     topiramate (Topamax) 25 mg tablet; Take 1 tablet daily x 7 days then increase to 1 tablet twice daily    Left Rotator cuff tear  S/p 1/23/25 will be getting left rotator cuff repair  with Dr. Angela  S/p physical therapy   Pt has been cleared for walking      Obesity   BMI 34.33 kg/m2  one more month of phentermine back at the 37.5 mg daily   GLP-1 agonist is no longer covered by her insurance.   Exercise is not currently an option due " to recent surgery  Medications: Wellbutrin (severe leg cramping)  Discussed Topamax and phentermine   Add topamax 25mg bid   Physical exam was stable. Discussed maintaining diets such as DASH to help maintain normal  Phentermine was increased back to 37.5mg daily   Advised to take in the morning           Toshia Lawson DO 03/12/25 2:02 PM        [1]   Current Outpatient Medications:     cholecalciferol (Vitamin D3) 50 mcg (2,000 unit) capsule, Take by mouth., Disp: , Rfl:     cyanocobalamin, vitamin B-12, 1,000 mcg/mL drops, Take by mouth., Disp: , Rfl:     phentermine (Adipex-P) 37.5 mg tablet, Take 1 tablet (37.5 mg) by mouth once daily in the morning. Take before meals., Disp: 30 tablet, Rfl: 0    gabapentin (Neurontin) 300 mg capsule, Take 1 capsule (300 mg) by mouth once daily at bedtime for 5 days., Disp: 5 capsule, Rfl: 0

## 2025-06-13 ENCOUNTER — TREATMENT (OUTPATIENT)
Dept: PHYSICAL THERAPY | Facility: CLINIC | Age: 55
End: 2025-06-13
Payer: COMMERCIAL

## 2025-06-13 DIAGNOSIS — M75.112 INCOMPLETE TEAR OF LEFT ROTATOR CUFF, UNSPECIFIED WHETHER TRAUMATIC: ICD-10-CM

## 2025-06-13 PROCEDURE — 97110 THERAPEUTIC EXERCISES: CPT | Mod: GP | Performed by: PHYSICAL THERAPIST

## 2025-06-13 PROCEDURE — 97140 MANUAL THERAPY 1/> REGIONS: CPT | Mod: GP | Performed by: PHYSICAL THERAPIST

## 2025-06-13 NOTE — PROGRESS NOTES
Physical Therapy Treatment and discharge  Patient Name: Yadi Rocha  MRN: [id number]  Today's Date: 6/13/2025          PT Therapeutic Procedures Time Entry  Therapeutic Exercise Time Entry: 30  Manual Therapy Time Entry: 8    INSURANCE:  Visit Number:24  RE-assessment on visit:  9 on 3/25/25  and on visit 19 on 4/209/25  Insurance Type: Payor: [employer] / Plan: ANTHEM TRADITIONAL / Product Type: *No Product type* /   CMS Re-Certification Period:2/17/25 to 5/18/25  and 5/19/25 to 8/17/25    Current Problem  1. Incomplete tear of left rotator cuff, unspecified whether traumatic  Follow Up In Physical Therapy          PRECAUTIONS:  work on range of motion  of the extremity. She will not begin any strengthening exercises until she is 3 months postoperative.      PMH: (pertinent to PT evaluation)   none  *See Epic EMR for complete list.    Medical History Form: Reviewed (scanned into chart)    Other Measures  Other Outcome Measures: ASES      29      PAIN:    Current:  0/10       SUBJECTIVE:  The pt has no pain. The arm still doesn't always do what she wants it to.  It is stiffer and IR     OBJECTIVE  POSTURE:  Patient no longer supports her arm with the other UE at times  Mild Protraction of L shoulder     L SHOULDER:           AROM                                                                       Flexion                          148  degrees                   Scaption                      140 degrees    Extension                      60  degrees          Abduction                     135 degrees       ER at neutral                 70  degrees                             IR at neutral                 to T8                  SHOULDER  Strength:       Flexion:  5/5  Abduction    5/5  IR   5/5  ER    5/5     ELBOW   ROM:         L           Flexion                         WFL        Extension                     0                          ELBOW   MMT:         L        Flexion                           5/5            "       Extension                       5/5                                 PALPATION:   Tightness and tenderness noted in L UT, L cervical and L shoulder including scapular muscles    TREATMENT:  Manual Therapy:    STM to L anterior shoulder with PROM      Therapeutic Exercise:    UBE with resistsance   forward 2 minutes   backward 2 minutes    Finger ladder   Flexion  to  33 x4   Abduction  to 33 x 4      3# Wand in standing    ER x10   Chest press x10   Flexion above 90 degrees x10   Horizontal abd/add  x10  Bicep curl  3#  1x10     Cable Cross   Lat pull with tricep extension 10#  1x10  Rows 10#  1x10   Long arm extension 7#  1x15   ER  3#  1x10    IR 3#  1x10      Arm lift off  9 o'clock  x10  11 o'clock x10    Home exercise program: (HEP)  Issued \"**\" exercise as new HEP with written copy issued.   \"*\" exercises are current HEP including:   Prone    **I shoulder extension B x10   **T mid trap  L x10   **Y low trap   B x10    Standing GTB  Rows    ER     IR       *Pendulum exercise - DNP    *Standing with UE on table and weight shifting     Lateral   Forward and back    *Table glide in standing    Flexion   Circles    *Ball rolling (redl) on table   CW, CCW, forward and back    Table glides in sitting   *Flexion x10  to 92 degrees   *Abduction x10 to 95  *ER x10    Shoulder IR stretch at counter    *Strengthening exercises for the none surgical (R) UE with GTB x10 each   IR   ER   Row   Bicep curl   Tricep curl    Arm lift off  8 o'clock  x5  9 o'clock  x5  ==========================================================      Education/instruction:  New:    Discussed  discharge  Prior:  Pt educated in:  + current status, general goals, treatment plan  + use of body on arm for hygiene  + Expectations for therapy  + Benefits of self active assistive and passive range of motion  + Added ball rolling to HEP  +Weaning from sling  +Able to remove sling with arm supported, especially if fingers are getting  +Realistic " Progression  +Expected time frame for therapy  +Precautions  +New HEP  +progress and nature of progression  +use the sling as needed to reduced pain and offer support.    +may use heat or ice  + edema control  +Discussed new exercise/stretches per doctor  +Discussed progress  +Added above home exercises and issued green Thera-Band  +Pt was educated on the purpose and use of kinesio tape including precautions, and safe removal directions.  +added exercise to R UE due to reported weakness  + R UE exercises are optional;  + Discussed  progression.   Added arm lift off    PAIN  End of session pain rating:  no complaints    ASSESSMENT:   The pt has excellent progress with all exercises.  She has met the majority of her long term goals and has HEP to continued to progress strengthening.  Not restrictions in tissue noted with PROM At this time, she no longer requires skilled PT.      Compliance with HEP:     good  Assessment of current progress against goals:    progressing well      GOALS:  Pt stated goal:  Full use of the left upper extremity     Short term goals:  + Increase left shoulder ROM by 20 degrees (MET)  + Reduce pain by 2 points with movement (MET)  + Independent in a basic HEP per protocol (MET)  + Able to sleep at least 6 hours a night (MET)     Long term goals:  + reduce pain to <3/10(MET)  + Full AROM in left shoulder as needed for reaching over head(PARTIALLY MET)  + 5/5 strength in left shoulder and scapular stabilizers for stability during functional reach and lifting(MET)  + Improved posture and independent postural correction for reduced pain and proper biomechanics for optimal shoulder function(MET)  + Independent in a HEP for self-management of symptoms.((MET)  + Reduce soft tissue restrictions for normal biomechanics and function((MET)  + Improve function with reduced reported disability by 10% on ASES (MET)  + Able to bathe and dress using the left UE without increased pain(MET)  + Able to lift  and reach without increased pain or compensation as needed for home and work(MET)     New goal  + Improve function with reduced reported disability ASES to 25/30(MET)     PLAN  DISCHARGE FROM SKILLED PT.

## 2025-07-07 ENCOUNTER — APPOINTMENT (OUTPATIENT)
Dept: PRIMARY CARE | Facility: CLINIC | Age: 55
End: 2025-07-07
Payer: COMMERCIAL

## 2025-07-13 LAB
ATRIAL RATE: 84 BPM
P AXIS: 37 DEGREES
P OFFSET: 180 MS
P ONSET: 127 MS
PR INTERVAL: 178 MS
Q ONSET: 216 MS
QRS COUNT: 14 BEATS
QRS DURATION: 72 MS
QT INTERVAL: 380 MS
QTC CALCULATION(BAZETT): 449 MS
QTC FREDERICIA: 424 MS
R AXIS: 19 DEGREES
T AXIS: 33 DEGREES
T OFFSET: 406 MS
VENTRICULAR RATE: 84 BPM

## 2025-07-22 ENCOUNTER — APPOINTMENT (OUTPATIENT)
Dept: PRIMARY CARE | Facility: CLINIC | Age: 55
End: 2025-07-22
Payer: COMMERCIAL

## 2025-07-22 VITALS — HEART RATE: 76 BPM | HEIGHT: 68 IN | BODY MASS INDEX: 34.4 KG/M2 | OXYGEN SATURATION: 97 % | WEIGHT: 227 LBS

## 2025-07-22 DIAGNOSIS — E66.811 OBESITY, CLASS I, BMI 30-34.9: ICD-10-CM

## 2025-07-22 DIAGNOSIS — Z12.31 ENCOUNTER FOR SCREENING MAMMOGRAM FOR MALIGNANT NEOPLASM OF BREAST: Primary | ICD-10-CM

## 2025-07-22 DIAGNOSIS — E66.811 CLASS 1 DRUG-INDUCED OBESITY WITHOUT SERIOUS COMORBIDITY WITH BODY MASS INDEX (BMI) OF 33.0 TO 33.9 IN ADULT: ICD-10-CM

## 2025-07-22 DIAGNOSIS — E55.9 VITAMIN D DEFICIENCY: ICD-10-CM

## 2025-07-22 DIAGNOSIS — E66.1 CLASS 1 DRUG-INDUCED OBESITY WITHOUT SERIOUS COMORBIDITY WITH BODY MASS INDEX (BMI) OF 33.0 TO 33.9 IN ADULT: ICD-10-CM

## 2025-07-22 PROCEDURE — 3008F BODY MASS INDEX DOCD: CPT | Performed by: STUDENT IN AN ORGANIZED HEALTH CARE EDUCATION/TRAINING PROGRAM

## 2025-07-22 PROCEDURE — 99214 OFFICE O/P EST MOD 30 MIN: CPT | Performed by: STUDENT IN AN ORGANIZED HEALTH CARE EDUCATION/TRAINING PROGRAM

## 2025-07-22 RX ORDER — SEMAGLUTIDE 0.5 MG/.5ML
0.5 INJECTION, SOLUTION SUBCUTANEOUS WEEKLY
Qty: 2 ML | Refills: 0 | Status: SHIPPED | OUTPATIENT
Start: 2025-07-22 | End: 2025-08-13

## 2025-07-22 RX ORDER — SEMAGLUTIDE 1 MG/.5ML
1 INJECTION, SOLUTION SUBCUTANEOUS WEEKLY
Qty: 2 ML | Refills: 0 | Status: SHIPPED | OUTPATIENT
Start: 2025-08-22 | End: 2025-09-13

## 2025-07-22 NOTE — PROGRESS NOTES
"  Subjective   Patient ID:   Yadi Rocha is a  55 y.o. female who presents for Follow-up (Patient is present in office for a 1 month follow up- Adipex and Topamax -no weight loss - stopped Topamax due to mental change- out of Adipex 151/96).     HPI   Obesity   Pt has been struggling with with with mental fog sense the Topamax.   She has stopped the medication.       Current Medications[1]    Visit Vitals  Pulse 76   Ht 1.727 m (5' 8\")   Wt 103 kg (227 lb)   SpO2 97%   BMI 34.52 kg/m²   OB Status Postmenopausal   Smoking Status Never   BSA 2.22 m²     Wt Readings from Last 10 Encounters:   07/22/25 103 kg (227 lb)   06/09/25 103 kg (226 lb 6.4 oz)   05/14/25 99.3 kg (219 lb)   05/08/25 102 kg (225 lb 12.8 oz)   04/08/25 102 kg (224 lb)   04/02/25 95.7 kg (211 lb)   02/20/25 102 kg (225 lb)   02/05/25 99.3 kg (219 lb)   01/23/25 99.6 kg (219 lb 9.3 oz)   01/09/25 100 kg (220 lb 14.4 oz)         Objective   Physical Exam  Vitals reviewed.   Constitutional:       Appearance: Normal appearance.     Cardiovascular:      Rate and Rhythm: Normal rate and regular rhythm.      Heart sounds: No murmur heard.  Pulmonary:      Effort: Pulmonary effort is normal. No respiratory distress.      Breath sounds: Normal breath sounds. No wheezing.     Musculoskeletal:      Cervical back: Neck supple.      Left lower leg: No edema.     Neurological:      Mental Status: She is alert.           Assessment/Plan   There are no diagnoses linked to this encounter.     Obesity   BMI 34.33 kg/m2  one more month of phentermine back at the 37.5 mg daily   GLP-1 agonist is no longer covered by her insurance.   Exercise is not currently an option due to recent surgery  Medications: Wellbutrin (severe leg cramping)  Discussed Topamax and phentermine   Previous medications: topamax 25mg bid (stopped due to brain fog)  Physical exam was stable. Discussed maintaining diets such as DASH to help maintain normal  Phentermine was increased back " to 37.5mg daily   Advised to take in the morning     Left Rotator cuff tear  S/p 1/23/25 will be getting left rotator cuff repair  with Dr. Angela  S/p physical therapy   Pt has been cleared for walking     Toshia C DO Renny 03/12/25 2:02 PM        [1]   Current Outpatient Medications:     cholecalciferol (Vitamin D3) 50 mcg (2,000 unit) capsule, Take by mouth., Disp: , Rfl:     cyanocobalamin, vitamin B-12, 1,000 mcg/mL drops, Take by mouth., Disp: , Rfl:     phentermine (Adipex-P) 37.5 mg tablet, Take 1 tablet (37.5 mg) by mouth once daily in the morning. Take before meals., Disp: 30 tablet, Rfl: 0    gabapentin (Neurontin) 300 mg capsule, Take 1 capsule (300 mg) by mouth once daily at bedtime for 5 days., Disp: 5 capsule, Rfl: 0    topiramate (Topamax) 25 mg tablet, Take 1 tablet daily x 7 days then increase to 1 tablet twice daily, Disp: 60 tablet, Rfl: 5

## 2025-08-21 DIAGNOSIS — E66.811 OBESITY, CLASS I, BMI 30-34.9: ICD-10-CM

## 2025-08-21 RX ORDER — SEMAGLUTIDE 0.5 MG/.5ML
INJECTION, SOLUTION SUBCUTANEOUS
Qty: 2 ML | Refills: 0 | Status: SHIPPED | OUTPATIENT
Start: 2025-08-21

## 2025-09-17 ENCOUNTER — APPOINTMENT (OUTPATIENT)
Dept: RADIOLOGY | Facility: HOSPITAL | Age: 55
End: 2025-09-17
Payer: COMMERCIAL

## 2025-10-20 ENCOUNTER — APPOINTMENT (OUTPATIENT)
Dept: PRIMARY CARE | Facility: CLINIC | Age: 55
End: 2025-10-20
Payer: COMMERCIAL

## (undated) DEVICE — MAT, FLOOR, SUCTION, LOW PROFILE, 50 X 34

## (undated) DEVICE — Device

## (undated) DEVICE — APPLICATOR, CHLORAPREP, W/ORANGE TINT, 26ML

## (undated) DEVICE — KIT, BEACH CHAIR TRIMANO

## (undated) DEVICE — BUR, OVAL, STERLING, HIGH SPEED, 6 MM

## (undated) DEVICE — DRESSING, GAUZE, PETROLATUM, PATCH, XEROFORM, 1 X 8 IN, STERILE

## (undated) DEVICE — BUR, GREAT WHITE, 4.2MM

## (undated) DEVICE — GOWN, SURGICAL, UROLOGY, IMPERVIOUS, XLONG, XLARGE, DISPOSABLE

## (undated) DEVICE — REAMER, ACORN, 8MM

## (undated) DEVICE — DRAPE, SHEET, U, STERI DRAPE, 47 X 51 IN, DISPOSABLE, STERILE

## (undated) DEVICE — NEEDLE, SPINAL, QUINCKE, 18 G X 3.5 IN, PINK HUB

## (undated) DEVICE — SYRINGE, 50 CC, LUER LOCK

## (undated) DEVICE — CANNULA, CLEAR, THREADED, 7.0 X 75MM

## (undated) DEVICE — SOLUTION, IRRIGATION, LACTATED RINGERS, 3000 ML, BAG

## (undated) DEVICE — SUTURE SYSTEM, EXPRESSEW III NEEDLES

## (undated) DEVICE — SUTURE, ETHILON, 3-0, 18 IN, PS1, BLACK

## (undated) DEVICE — STRIP, SKIN CLOSURE, STERI STRIP, REINFORCED, 0.5 X 4 IN

## (undated) DEVICE — MASK, FACE, TENET, FOAM POSITIONING, DISPOSABLE

## (undated) DEVICE — TUBING, SUCTION, CONNECTING, NON-CONDUCTIVE, SURE GRIP CONNECTORS, 3/16 IN X 10 FT

## (undated) DEVICE — KIT, TENODESIS, DISP

## (undated) DEVICE — CANNULA, CLEAR, THREADED, 8.5 X 55MM

## (undated) DEVICE — DRAPE, SHEET, U, W/ADHESIVE STRIP, IMPERVIOUS, 60 X 70 IN, DISPOSABLE, LF, STERILE

## (undated) DEVICE — TUBING, ARTHROSCOPIC INFLOW, 10K